# Patient Record
Sex: FEMALE | Race: WHITE | NOT HISPANIC OR LATINO | Employment: FULL TIME
[De-identification: names, ages, dates, MRNs, and addresses within clinical notes are randomized per-mention and may not be internally consistent; named-entity substitution may affect disease eponyms.]

---

## 2017-11-11 ENCOUNTER — HEALTH MAINTENANCE LETTER (OUTPATIENT)
Age: 42
End: 2017-11-11

## 2018-01-23 NOTE — PROGRESS NOTES
Susannah Goddard is here for a general check up. She is not fasting. She is due for eye exams and due for dental visits. Wears seat belt-yes. Bike helmet- na.   Concerns today:    RAZA Davalos is a 43 yo woman who is here for a check up. She drives in from [place] to see me. She is due for routine pap smear. She would like routine STD screening. She is up to date on vaccines. She has a previous history of alcohol and drug abuse but has been sober since 2011.     Raynaud's disease   She has Raynauds and is asking for refill of her amlodipine.     Depression and anxiety  Susannah is under the care of a psychiatrist in [place]. She reports she is doing well. Is currently being weaned off Cymbalta and Lexapro. She was started on Fluoxetine 20mg daily. She remains on Seroquel. She is being prescribed medical marijuana for treatment of PTSD. She feels that is helping. She has been sober since 2011. 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 She completed her masters. She is excited to me moving forward in her career.     PHQ9 score = 2  FRANK 7 score = 18      Health Maintenance   Topic Date Due     PHQ-9 Q1 MONTH  12/01/2013     INFLUENZA VACCINE (SYSTEM ASSIGNED)  09/01/2017     PAP SCREENING Q3 YR (SYSTEM ASSIGNED)  09/06/2017     TETANUS IMMUNIZATION (SYSTEM ASSIGNED)  01/28/2020         Patient Active Problem List   Diagnosis     Depression with anxiety     Herpes, genital     Alcohol dependence in remission (H)       Past Surgical History:   Procedure Laterality Date     CHOLECYSTECTOMY  2009     DILATION AND CURETTAGE  2002     IUD PARAGARD  2009       Family History   Problem Relation Age of Onset     Breast Cancer Paternal Grandmother 84     HEART DISEASE Paternal Grandfather 60     Heart disease     HEART DISEASE Maternal Grandfather 60     Heart disease     Depression Sister      Depression Father      Hypertension Father      Alcohol/Drug Father        Social  , no children , current  partner  Finished classes for masters, Licensed Alcohol and Drug counselor  She is starting an [employer] at [employer], paid position    HABITS:  Tob: none  ETOH: no, sober since 2011  THC for a year, medical Dr. Isael penaloza Rx for PTSD  Calcium: soy milk, 2 per day  Caffeine: 2-3 per day  Exercise: 3x per week    OB/GYN HISTORY:  LMP: Patient's last menstrual period was 01/04/2018 (approximate).  Hx abnormal pap?  none  STD hx?  HSV 2, no current outbreak  cycle length:  Monthly  Flow x 4-5 days  dysmenorrhea/PMS:  minimal  Vasomotor sx:  none  Contraception: condoms  G 1   P 1   A 0  Self Breast exam:  none    Current Outpatient Prescriptions   Medication Sig Dispense Refill     FLUOXETINE HCL PO Take 20 mg by mouth daily       amLODIPine (NORVASC) 2.5 MG tablet Take 1 tablet (2.5 mg) by mouth daily Needs clinic visit for further refills  FINAL FILL 30 tablet 0     acyclovir (ZOVIRAX) 400 MG tablet Take 1 tablet (400 mg) by mouth daily Needs clinic visit and labs for further refills  FINAL FILL 30 tablet 0     QUEtiapine (SEROQUEL) 25 MG tablet Take 50 mg by mouth 2 times daily        ibuprofen (ADVIL,MOTRIN) 400 MG tablet Take 400 mg by mouth daily 800 mg       Melatonin-Pyridoxine (MELATONIN-VITAMIN B-6 PO) Take 1 tablet by mouth nightly as needed       escitalopram (LEXAPRO) 20 MG tablet Take 1 tablet (20 mg) by mouth daily 90 tablet 3     naproxen sodium (ALEVE) 220 MG tablet Take  by mouth as needed.       atomoxetine (STRATTERA) 10 MG capsule Take 3 capsules by mouth 3 times daily.       DULoxetine (CYMBALTA) 30 MG capsule Take 90 mg by mouth daily. 30 mg capsule qam  60 mg capsule ghs       Multiple Vitamin (MULTIVITAMIN  S) CAPS Take one daily       Allergies   Allergen Reactions     Dilaudid [Hydromorphone]          ROS  CONSTITUTIONAL:NEGATIVE for fever, chills, change in weight, she is overweight  INTEGUMENTARY/SKIN: NEGATIVE for worrisome rashes, moles or lesions  EYES: NEGATIVE for  "vision changes or irritation  ENT/MOUTH: NEGATIVE for ear, mouth and throat problems  RESP:NEGATIVE for significant cough or SOB  BREAST: NEGATIVE for masses, tenderness or discharge  CV: NEGATIVE for chest pain, palpitations, PEREZ, orthopnea, PND  or peripheral edema  GI: NEGATIVE for nausea, abdominal pain, heartburn, or change in bowel habits  :NEGATIVE for frequency, dysuria, or hematuria, hx of genital herpes  MUSCULOSKELETAL:NEGATIVE for significant arthralgias or myalgia  NEURO: NEGATIVE for weakness, dizziness or paresthesias  ENDOCRINE: NEGATIVE for polyuria/dipsia,  temperature intolerance, skin/hair changes  HEME/ALLERGY/IMMUNE: NEGATIVE for bleeding problems  PSYCHIATRIC: hx of anxiety, PTSD, depression, currently under the care of a psychiatrist    EXAM  BP (!) 143/91  Pulse 98  Temp 98.3  F (36.8  C) (Oral)  Ht 5' 5.16\" (165.5 cm)  Wt 191 lb 4 oz (86.8 kg)  LMP 01/04/2018 (Approximate)  SpO2 100%  BMI 31.67 kg/m2      GENERAL APPEARANCE: Alert, pleasant, NAD, overweight  EYES: PERRL, EOMI, conjunctiva clear  HENT: TM normal bilaterally. Nose and mouth without lesions  NECK: no adenopathy, thyroid normal to palpation   RESP: lungs clear to auscultation bilaterally  BREAST: normal without masses, no tenderness or nipple discharge and no palpable  axillary masses or adenopathy   CV: regular rate and rhythm, normal S1 S2, no murmur, no carotid bruits  ABDOMEN: soft, nontender, without HSM or masses. Bowel sounds normal  : External genitalia without lesions, cervix normal, pap easily obtained, no abnormal discharge, bimanual exam without adnexal masses or tenderness, uterus non enlarged  RECTAL EXAM: not done   MS: extremities normal- no gross deformities noted, no tender, hot or swollen joints.    SKIN: no suspicious lesions or rashes  NEURO: Normal strength and tone, sensory exam grossly normal, DTR normoreflexive in upper and lower extremities  PSYCH: mentation appears normal. and affect " normal/bright.  EXT: no peripheral edema, pedal pulses palpable    Assessment:  (Z00.00) Routine general medical examination at a health care facility  (primary encounter diagnosis)  Comment: 41 yo woman in stable health  Plan: Lipid Panel ([place]), CBC with platelets,         Comprehensive Metabolic Panel ([place]), HPV         High Risk Types DNA Cervical        Anticipatory guidance given today regarding diet, exercise, calcium intake.  Pap is done today. Recommend sensible approach to weight loss    (Z12.4) Screening for malignant neoplasm of cervix  Comment: due for routine screening  Plan: Pap imaged thin layer screen with HPV -         recommended age 30 - 65 years (select HPV order        below)            (Z11.3) Screen for STD (sexually transmitted disease)  Comment: no current symptoms  Plan: Neisseria gonorrhoeae PCR, Chlamydia         trachomatis PCR, Anti Treponema, HIV Antigen         Antibody Combo        Discussed safe sex, consistent use of condoms with partners    (I73.00) Raynaud's disease without gangrene  Comment: she has worsening of symptoms in winter months  Plan: amLODIPine (NORVASC) 5 MG tablet        Refilled michela Nunez MD  Internal Medicine/Pediatrics

## 2018-01-24 ENCOUNTER — OFFICE VISIT (OUTPATIENT)
Dept: FAMILY MEDICINE | Facility: CLINIC | Age: 43
End: 2018-01-24
Payer: COMMERCIAL

## 2018-01-24 VITALS
HEART RATE: 98 BPM | WEIGHT: 191.25 LBS | OXYGEN SATURATION: 100 % | DIASTOLIC BLOOD PRESSURE: 83 MMHG | SYSTOLIC BLOOD PRESSURE: 146 MMHG | HEIGHT: 65 IN | BODY MASS INDEX: 31.86 KG/M2 | TEMPERATURE: 98.3 F

## 2018-01-24 DIAGNOSIS — Z12.4 SCREENING FOR MALIGNANT NEOPLASM OF CERVIX: ICD-10-CM

## 2018-01-24 DIAGNOSIS — I73.00 RAYNAUD'S DISEASE WITHOUT GANGRENE: ICD-10-CM

## 2018-01-24 DIAGNOSIS — Z11.3 SCREEN FOR STD (SEXUALLY TRANSMITTED DISEASE): ICD-10-CM

## 2018-01-24 DIAGNOSIS — Z00.00 ROUTINE GENERAL MEDICAL EXAMINATION AT A HEALTH CARE FACILITY: Primary | ICD-10-CM

## 2018-01-24 LAB
ALBUMIN SERPL-MCNC: 3.8 G/DL (ref 3.3–4.6)
ALP SERPL-CCNC: 55 U/L (ref 40–150)
ALT SERPL-CCNC: 24 U/L (ref 0–50)
AST SERPL-CCNC: 25 U/L (ref 0–45)
BILIRUB SERPL-MCNC: 0.6 MG/DL (ref 0.2–1.3)
BUN SERPL-MCNC: 14 MG/DL (ref 5–24)
CALCIUM SERPL-MCNC: 9.4 MG/DL (ref 8.5–10.4)
CHLORIDE SERPLBLD-SCNC: 104 MMOL/L (ref 94–109)
CHOLEST SERPL-MCNC: 153 MG/DL (ref 0–200)
CHOLEST/HDLC SERPL: 3 {RATIO} (ref 0–5)
CO2 SERPL-SCNC: 29 MMOL/L (ref 20–32)
CREAT SERPL-MCNC: 0.8 MG/DL (ref 0.6–1.3)
EGFR CALCULATED (BLACK REFERENCE): 100.2
EGFR CALCULATED (NON BLACK REFERENCE): 82.8
ERYTHROCYTE [DISTWIDTH] IN BLOOD BY AUTOMATED COUNT: 13.4 % (ref 10–15)
FASTING SPECIMEN: NO
GLUCOSE SERPL-MCNC: 93 MG/DL (ref 60–109)
HCT VFR BLD AUTO: 42.8 % (ref 35–47)
HDLC SERPL-MCNC: 52 MG/DL
HGB BLD-MCNC: 14.2 G/DL (ref 11.7–15.7)
LDLC SERPL CALC-MCNC: 83 MG/DL (ref 0–129)
MCH RBC QN AUTO: 30.9 PG (ref 26.5–33)
MCHC RBC AUTO-ENTMCNC: 33.2 G/DL (ref 31.5–36.5)
MCV RBC AUTO: 93 FL (ref 78–100)
PLATELET # BLD AUTO: 325 10E9/L (ref 150–450)
POTASSIUM SERPL-SCNC: 4.1 MMOL/L (ref 3.4–5.3)
PROT SERPL-MCNC: 7.1 G/DL (ref 6.8–8.8)
RBC # BLD AUTO: 4.6 10E12/L (ref 3.8–5.2)
SODIUM SERPL-SCNC: 141 MMOL/L (ref 133–144)
TRIGL SERPL-MCNC: 90 MG/DL (ref 0–150)
VLDL-CHOLESTEROL: 18 (ref 7–32)
WBC # BLD AUTO: 9.3 10E9/L (ref 4–11)

## 2018-01-24 RX ORDER — AMLODIPINE BESYLATE 5 MG/1
5 TABLET ORAL DAILY
Qty: 90 TABLET | Refills: 3 | Status: SHIPPED | OUTPATIENT
Start: 2018-01-24 | End: 2020-01-08

## 2018-01-24 RX ORDER — DULOXETIN HYDROCHLORIDE 30 MG/1
60 CAPSULE, DELAYED RELEASE ORAL DAILY
Qty: 60 CAPSULE | Refills: 0 | COMMUNITY
Start: 2018-01-24 | End: 2021-11-10

## 2018-01-24 NOTE — NURSING NOTE
"42 year old  Chief Complaint   Patient presents with     Physical     Annual physical and discuss labs.     Patient Inquiry     pt wants to discuss medical cannabis also wants to discuss hypertension       Blood pressure (!) 143/91, pulse 98, temperature 98.3  F (36.8  C), temperature source Oral, height 5' 5.16\" (165.5 cm), weight 191 lb 4 oz (86.8 kg), last menstrual period 01/04/2018, SpO2 100 %. Body mass index is 31.67 kg/(m^2).  Patient Active Problem List   Diagnosis     Depression with anxiety     Herpes, genital     Alcohol dependence in remission (H)       Wt Readings from Last 2 Encounters:   01/24/18 191 lb 4 oz (86.8 kg)   04/08/15 189 lb (85.7 kg)     BP Readings from Last 3 Encounters:   01/24/18 (!) 143/91   04/08/15 131/87   09/06/14 131/85         Current Outpatient Prescriptions   Medication     FLUOXETINE HCL PO     amLODIPine (NORVASC) 2.5 MG tablet     acyclovir (ZOVIRAX) 400 MG tablet     QUEtiapine (SEROQUEL) 25 MG tablet     ibuprofen (ADVIL,MOTRIN) 400 MG tablet     Melatonin-Pyridoxine (MELATONIN-VITAMIN B-6 PO)     escitalopram (LEXAPRO) 20 MG tablet     naproxen sodium (ALEVE) 220 MG tablet     atomoxetine (STRATTERA) 10 MG capsule     DULoxetine (CYMBALTA) 30 MG capsule     Multiple Vitamin (MULTIVITAMIN  S) CAPS     No current facility-administered medications for this visit.        Social History   Substance Use Topics     Smoking status: Former Smoker     Types: Cigarettes     Smokeless tobacco: Never Used      Comment: Previous social smoker     Alcohol use No      Comment: sober since 2/9/11       Health Maintenance Due   Topic Date Due     PHQ-9 Q1 MONTH  12/01/2013     INFLUENZA VACCINE (SYSTEM ASSIGNED)  09/01/2017     PAP SCREENING Q3 YR (SYSTEM ASSIGNED)  09/06/2017       Lab Results   Component Value Date    PAP NIL 09/06/2014 January 24, 2018 1:56 PM  "

## 2018-01-24 NOTE — MR AVS SNAPSHOT
After Visit Summary   1/24/2018    Susannah Goddard    MRN: [id number]           Patient Information     Date Of Birth          1975        Visit Information        Provider Department      1/24/2018 1:40 PM Madyson Nunez MD [facility]        Today's Diagnoses     Routine general medical examination at a health care facility    -  1    Screening for malignant neoplasm of cervix        Screen for STD (sexually transmitted disease)          Care Instructions      Preventive Health Recommendations  Female Ages 40 to 49    Yearly exam:     See your health care provider every year in order to  1. Review health changes.   2. Discuss preventive care.    3. Review your medicines if your doctor prescribed any.      Get a Pap test every three years (unless you have an abnormal result and your provider advises testing more often).      If you get Pap tests with HPV test, you only need to test every 5 years, unless you have an abnormal result. You do not need a Pap test if your uterus was removed (hysterectomy) and you have not had cancer.      You should be tested each year for STDs (sexually transmitted diseases), if you're at risk.       Ask your doctor if you should have a mammogram.      Have a colonoscopy (test for colon cancer) if someone in your family has had colon cancer or polyps before age 50.       Have a cholesterol test every 5 years.       Have a diabetes test (fasting glucose) after age 45. If you are at risk for diabetes, you should have this test every 3 years.    Shots: Get a flu shot each year. Get a tetanus shot every 10 years.     Nutrition:     Eat at least 5 servings of fruits and vegetables each day.    Eat whole-grain bread, whole-wheat pasta and brown rice instead of white grains and rice.    Talk to your provider about Calcium and Vitamin D.     Lifestyle    Exercise at least 150 minutes a week (an average of 30 minutes a day, 5 days a week). This will help you control  "your weight and prevent disease.    Limit alcohol to one drink per day.    No smoking.     Wear sunscreen to prevent skin cancer.    See your dentist every six months for an exam and cleaning.          Follow-ups after your visit        Who to contact     Please call your clinic at [phone] to:    Ask questions about your health    Make or cancel appointments    Discuss your medicines    Learn about your test results    Speak to your doctor   If you have compliments or concerns about an experience at your clinic, or if you wish to file a complaint, please contact [facility] Patient Relations at [phone] or email us at [email or web address]         Additional Information About Your Visit        [employer] Information     [employer] gives you secure access to your electronic health record. If you see a primary care provider, you can also send messages to your care team and make appointments. If you have questions, please call your primary care clinic.  If you do not have a primary care provider, please call [phone] and they will assist you.      [employer] is an electronic gateway that provides easy, online access to your medical records. With [employer], you can request a clinic appointment, read your test results, renew a prescription or communicate with your care team.     To access your existing account, please contact your [facility] or call [phone] for assistance.        Care EveryWhere ID     This is your Care EveryWhere ID. This could be used by other organizations to access your [place] medical records  [id number]        Your Vitals Were     Pulse Temperature Height Last Period Pulse Oximetry BMI (Body Mass Index)    98 98.3  F (36.8  C) (Oral) 5' 5.16\" (165.5 cm) 01/04/2018 (Approximate) 100% 31.67 kg/m2       Blood Pressure from Last 3 Encounters:   01/24/18 (!) 143/91   04/08/15 131/87   09/06/14 131/85    Weight from Last 3 " Encounters:   01/24/18 191 lb 4 oz (86.8 kg)   04/08/15 189 lb (85.7 kg)   09/06/14 181 lb (82.1 kg)              We Performed the Following     Anti Treponema     Chlamydia trachomatis PCR     HIV Antigen Antibody Combo     Neisseria gonorrhoeae PCR     Pap imaged thin layer screen with HPV - recommended age 30 - 65 years (select HPV order below)          Today's Medication Changes          These changes are accurate as of 1/24/18  2:25 PM.  If you have any questions, ask your nurse or doctor.               These medicines have changed or have updated prescriptions.        Dose/Directions    CYMBALTA 30 MG EC capsule   This may have changed:    - how much to take  - how to take this  - when to take this  - additional instructions   Generic drug:  DULoxetine   Changed by:  Madyson Nunez MD        Take one daily, tapering off   Quantity:  60 capsule   Refills:  0                Primary Care Provider Office Phone # Fax #    Madyson Nunez [phone]       [address] [place] [address]        Equal Access to Services     [facility]: Hadii otis beauchamp hadasho Soomaali, waaxda luqadaha, qaybta kaalmada adeegyada, chema bennett . So [facility] [phone].    ATENCIÓN: Si habla español, tiene a bowen disposición servicios gratuitos de asistencia lingüística. [facility] [phone].    We comply with applicable federal civil rights laws and [place] laws. We do not discriminate on the basis of race, color, national origin, age, disability, sex, sexual orientation, or gender identity.            Thank you!     Thank you for choosing [facility]  for your care. Our goal is always to provide you with excellent care. Hearing back from our patients is one way we can continue to improve our services. Please take a few minutes to complete the written survey that you may receive in the mail after your visit with us. Thank you!             Your Updated Medication  List - Protect others around you: Learn how to safely use, store and throw away your medicines at www.disposemymeds.org.          This list is accurate as of 1/24/18  2:25 PM.  Always use your most recent med list.                   Brand Name Dispense Instructions for use Diagnosis    acyclovir 400 MG tablet    ZOVIRAX    30 tablet    Take 1 tablet (400 mg) by mouth daily Needs clinic visit and labs for further refills  FINAL FILL    Genital herpes simplex, unspecified site       ALEVE 220 MG tablet   Generic drug:  naproxen sodium      Take  by mouth as needed.        CYMBALTA 30 MG EC capsule   Generic drug:  DULoxetine     60 capsule    Take one daily, tapering off        escitalopram 20 MG tablet    LEXAPRO    90 tablet    Take 1 tablet (20 mg) by mouth daily    Anxiety       FLUOXETINE HCL PO      Take 20 mg by mouth daily        ibuprofen 400 MG tablet    ADVIL/MOTRIN     Take 400 mg by mouth daily 800 mg        MELATONIN-VITAMIN B-6 PO      Take 1 tablet by mouth nightly as needed        MULTIvitamin  S Caps      Take one daily        SEROQUEL 25 MG tablet   Generic drug:  QUEtiapine      Take 50 mg by mouth 2 times daily        STRATTERA 10 MG capsule   Generic drug:  atomoxetine      Take 3 capsules by mouth 3 times daily.

## 2018-01-25 LAB
C TRACH DNA SPEC QL NAA+PROBE: NEGATIVE
HIV 1+2 AB+HIV1 P24 AG SERPL QL IA: NONREACTIVE
N GONORRHOEA DNA SPEC QL NAA+PROBE: NEGATIVE
SPECIMEN SOURCE: NORMAL
SPECIMEN SOURCE: NORMAL
T PALLIDUM IGG+IGM SER QL: NEGATIVE

## 2018-01-26 LAB
COPATH REPORT: NORMAL
PAP: NORMAL

## 2018-01-30 LAB
FINAL DIAGNOSIS: NORMAL
HPV HR 12 DNA CVX QL NAA+PROBE: NEGATIVE
HPV16 DNA SPEC QL NAA+PROBE: NEGATIVE
HPV18 DNA SPEC QL NAA+PROBE: NEGATIVE
SPECIMEN DESCRIPTION: NORMAL
SPECIMEN SOURCE CVX/VAG CYTO: NORMAL

## 2018-02-02 DIAGNOSIS — B37.31 YEAST INFECTION OF THE VAGINA: Primary | ICD-10-CM

## 2018-02-02 RX ORDER — FLUCONAZOLE 150 MG/1
150 TABLET ORAL ONCE
Qty: 2 TABLET | Refills: 0 | Status: SHIPPED | OUTPATIENT
Start: 2018-02-02 | End: 2018-02-02

## 2018-02-02 NOTE — TELEPHONE ENCOUNTER
See Mychart stream.  Using for yeast infection found on recent visit.     Isabelle Sibley RN  February 2, 2018 1:23 PM

## 2018-02-06 ASSESSMENT — PATIENT HEALTH QUESTIONNAIRE - PHQ9: 5. POOR APPETITE OR OVEREATING: NEARLY EVERY DAY

## 2018-02-06 ASSESSMENT — ANXIETY QUESTIONNAIRES
5. BEING SO RESTLESS THAT IT IS HARD TO SIT STILL: NEARLY EVERY DAY
GAD7 TOTAL SCORE: 18
6. BECOMING EASILY ANNOYED OR IRRITABLE: NEARLY EVERY DAY
3. WORRYING TOO MUCH ABOUT DIFFERENT THINGS: MORE THAN HALF THE DAYS
2. NOT BEING ABLE TO STOP OR CONTROL WORRYING: MORE THAN HALF THE DAYS
1. FEELING NERVOUS, ANXIOUS, OR ON EDGE: MORE THAN HALF THE DAYS
IF YOU CHECKED OFF ANY PROBLEMS ON THIS QUESTIONNAIRE, HOW DIFFICULT HAVE THESE PROBLEMS MADE IT FOR YOU TO DO YOUR WORK, TAKE CARE OF THINGS AT HOME, OR GET ALONG WITH OTHER PEOPLE: VERY DIFFICULT
7. FEELING AFRAID AS IF SOMETHING AWFUL MIGHT HAPPEN: NEARLY EVERY DAY

## 2018-02-07 ASSESSMENT — ANXIETY QUESTIONNAIRES: GAD7 TOTAL SCORE: 18

## 2018-02-07 ASSESSMENT — PATIENT HEALTH QUESTIONNAIRE - PHQ9: SUM OF ALL RESPONSES TO PHQ QUESTIONS 1-9: 2

## 2019-11-04 ENCOUNTER — HEALTH MAINTENANCE LETTER (OUTPATIENT)
Age: 44
End: 2019-11-04

## 2019-12-26 ENCOUNTER — TELEPHONE (OUTPATIENT)
Dept: FAMILY MEDICINE | Facility: CLINIC | Age: 44
End: 2019-12-26

## 2019-12-26 NOTE — TELEPHONE ENCOUNTER
Dr. Nunez - see below: OK to have the med refill appointment on 1/8/19 for amlodipine?  _________________________________  I called patient, the last time Dr. Nunez saw patient and prescribed amlodipine 5 mg was January, 2018. Since that time she was at [facility] and had it prescribed through a provider associated with that facility. She takes this medication for Raynaud's, takes it daily, and ran out 2 days ago. The provider is no longer at that clinic. She plans to go to Urgent Care today for a refill - she lives in [place], [place] about 2 hours away from [facility]. She drives to [facility] monthly to see her psych care team. She has Annual Exam with Dr. Nunez on 3/18/19. She has med refill appointment on 1/8/19 with Dr. Nunez to get amlodipine refilled.     Leana Garcia, RN  12/26/19  12:30 PM

## 2019-12-26 NOTE — TELEPHONE ENCOUNTER
[facility] Call Center    Phone Message    May a detailed message be left on voicemail: yes    Reason for Call: Medication Question or concern regarding medication   Prescription Clarification  Name of Medication: amlodipine   Prescribing Provider: was Dr Nunez in 2016   Pharmacy: NA   What on the order needs clarification? The pharmacy is going to send a request to Dr Mayra GOODSON          Action Taken: Message routed to:  [facility]

## 2019-12-26 NOTE — TELEPHONE ENCOUNTER
No refill until she is seen in clinic.   Sounds like she has a plan to go to  for med refill in interim.     Thanks   Madyson Nunez MD   Internal Medicine/Pediatrics

## 2020-01-07 NOTE — PROGRESS NOTES
"Susannah Goddard is a 44 year old female here for the following issues:    Raynaud's disease without gangrene  Susannah was started on a low dose amlodipine for treatment of her Raynaud's disease at an office visit with me in 04/08/2015.  She takes this amlodipine year round, and it has helped treat her Raynauds.  This has worked well, and she is requesting refills.    Alcohol Dependence in remission  Susannah checked into [place] [employer] on 04/2018.  She graduated from this in the end of May 2019.  She is sober, and her two year valentin will be in May 2020.  She is now living in [place].  She has been attending AA meetings, working 25 hours a week to continue attending meetings. She had some medication changes today. she is followed by a psychiatrist for treatment of anxiety and depression.        Patient Active Problem List   Diagnosis     Depression with anxiety     Herpes, genital     Alcohol dependence in remission (H)       Current Outpatient Medications   Medication Sig Dispense Refill     amLODIPine (NORVASC) 5 MG tablet Take 1 tablet (5 mg) by mouth daily 90 tablet 3     DULoxetine (CYMBALTA) 30 MG EC capsule Take 40 mg by mouth 2 times daily Take one daily, tapering off 60 capsule 0     FLUOXETINE HCL PO Take 10 mg by mouth daily        ibuprofen (ADVIL,MOTRIN) 400 MG tablet Take 400 mg by mouth daily 800 mg       Melatonin-Pyridoxine (MELATONIN-VITAMIN B-6 PO) Take 1 tablet by mouth nightly as needed       QUEtiapine (SEROQUEL) 25 MG tablet Take 25 mg by mouth 3 times daily        QUEtiapine Fumarate (SEROQUEL PO) Take 300 mg by mouth At Bedtime       Multiple Vitamin (MULTIVITAMIN  S) CAPS Take one daily       naproxen sodium (ALEVE) 220 MG tablet Take  by mouth as needed.         Allergies   Allergen Reactions     Dilaudid [Hydromorphone]         EXAM  BP (!) 137/95 (BP Location: Left arm, Patient Position: Sitting, Cuff Size: Adult Large)   Pulse 87   Temp 98.5  F (36.9  C) (Oral)   Ht 1.64 m (5' 4.57\")   Wt " 84.9 kg (187 lb 4 oz)   SpO2 97%   BMI 31.58 kg/m    Gen: Alert, pleasant, NAD  COR: S1,S2, no murmur  Lungs: CTA bilaterally, no rhonchi, wheezes or rales  Ext: warm, no edema      Assessment:  (I73.00) Raynaud's disease without gangrene  Comment: doing well, is asking for medication refill  Plan: amLODIPine (NORVASC) 5 MG tablet        Refilled x 1 yr.       Madyson Nunez MD  Internal Medicine/Pediatrics      I, Spencer Meraz, am serving as a scribe to document services personally performed by Dr. Madyson Nunez, based on data collection and the provider's statements to me. Dr. Nunez has reviewed, edited, and approved the above note.

## 2020-01-08 ENCOUNTER — OFFICE VISIT (OUTPATIENT)
Dept: FAMILY MEDICINE | Facility: CLINIC | Age: 45
End: 2020-01-08

## 2020-01-08 VITALS
HEIGHT: 65 IN | HEART RATE: 87 BPM | WEIGHT: 187.25 LBS | TEMPERATURE: 98.5 F | BODY MASS INDEX: 31.2 KG/M2 | DIASTOLIC BLOOD PRESSURE: 88 MMHG | SYSTOLIC BLOOD PRESSURE: 128 MMHG | OXYGEN SATURATION: 97 %

## 2020-01-08 DIAGNOSIS — I73.00 RAYNAUD'S DISEASE WITHOUT GANGRENE: ICD-10-CM

## 2020-01-08 RX ORDER — AMLODIPINE BESYLATE 5 MG/1
5 TABLET ORAL DAILY
Qty: 90 TABLET | Refills: 3 | Status: SHIPPED | OUTPATIENT
Start: 2020-01-08 | End: 2020-12-23

## 2020-01-08 ASSESSMENT — MIFFLIN-ST. JEOR: SCORE: 1483.36

## 2020-01-08 ASSESSMENT — PATIENT HEALTH QUESTIONNAIRE - PHQ9: SUM OF ALL RESPONSES TO PHQ QUESTIONS 1-9: 10

## 2020-01-08 NOTE — NURSING NOTE
"44 year old  Chief Complaint   Patient presents with     Recheck Medication     refill amlodipine         Blood pressure (!) 137/95, pulse 87, temperature 98.5  F (36.9  C), temperature source Oral, height 1.64 m (5' 4.57\"), weight 84.9 kg (187 lb 4 oz), SpO2 97 %. Body mass index is 31.58 kg/m .  Patient Active Problem List   Diagnosis     Depression with anxiety     Herpes, genital     Alcohol dependence in remission (H)       Wt Readings from Last 2 Encounters:   01/08/20 84.9 kg (187 lb 4 oz)   01/24/18 86.8 kg (191 lb 4 oz)     BP Readings from Last 3 Encounters:   01/08/20 (!) 137/95   01/24/18 146/83   04/08/15 131/87         Current Outpatient Medications   Medication     amLODIPine (NORVASC) 5 MG tablet     DULoxetine (CYMBALTA) 30 MG EC capsule     FLUOXETINE HCL PO     Melatonin-Pyridoxine (MELATONIN-VITAMIN B-6 PO)     QUEtiapine (SEROQUEL) 25 MG tablet     QUEtiapine Fumarate (SEROQUEL PO)     acyclovir (ZOVIRAX) 400 MG tablet     ibuprofen (ADVIL,MOTRIN) 400 MG tablet     Multiple Vitamin (MULTIVITAMIN  S) CAPS     naproxen sodium (ALEVE) 220 MG tablet     No current facility-administered medications for this visit.        Social History     Tobacco Use     Smoking status: Former Smoker     Types: Cigarettes     Smokeless tobacco: Never Used     Tobacco comment: Previous social smoker   Substance Use Topics     Alcohol use: No     Comment: sober since 2/9/11     Drug use: No       Health Maintenance Due   Topic Date Due     PHQ-9  02/24/2018     PREVENTIVE CARE VISIT  01/24/2019     INFLUENZA VACCINE (1) 09/01/2019     PHQ-2  01/01/2020       Lab Results   Component Value Date    PAP NIL 01/24/2018 January 8, 2020 3:52 PM    "

## 2020-11-22 ENCOUNTER — HEALTH MAINTENANCE LETTER (OUTPATIENT)
Age: 45
End: 2020-11-22

## 2020-12-22 NOTE — PROGRESS NOTES
"  Family Medicine Video Visit Note  Susannah Goddard is a 45 year old female who is being evaluated via a billable video visit.  Consent documented below.  Chief Complaint   Patient presents with     Recheck Medication     medication refill and labs      The patient has been notified of following:     Can you please confirm what state you are currently located in? [place]   \"If you are located in a state other than [place] we cannot proceed with your visit.  This is due to state licensing laws that do not allow your provider to practice in another state.  This is not a billing or insurance issue. Please let me know if you need prescriptions filled or have other immediate concerns and I will get that message to your care team. I can also have you speak to our  to make an appointment when you are back in the [place].\"       Video Visit Consent     \"This video visit will be conducted via a call between you and your physician/provider. We have found that certain health care needs can be provided without the need for an in-person physical exam.  This service lets us provide the care you need with a video conversation.  If a prescription is necessary we can send it directly to your pharmacy.  If lab work is needed we can place an order for that and you can then stop by our lab to have the test done at a later time.    Video visits are billed at different rates depending on your insurance coverage.  Please reach out to your insurance provider with any questions.    If during the course of the call the physician/provider feels a video visit is not appropriate, you will not be charged for this service.\"    Patient has given verbal consent for Video visit? Yes  How would you like to obtain your AVS? MyChart  If you are dropped from the video visit, the video invite should be resent to:   Will anyone else be joining your video visit? No  :[id number]}  Susannah Goddard is a 45 year old female who presents to clinic today for the " following health issues:    Raynauds Lisa is a 44 yo woman with hx of Raynauds. She takes amlodipine 5mg daily all year round for relief of symptoms. She is in need of medication refill.     Genital herpes  Susannah takes Valacyclovir 500mg daily to help prevent outbreaks of genital herpes. Last outbreak was roughly 5 month ago. Prior to taking the medication daily, she had an outbreak every 2-3 months. She also takes Lysine daily.   Take 500mg , and then if you feel an outbreatk 2x per day x 3 days.  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 Nonresidental intensive out patient, adolescents with substance use disorder.    Depression with anxiety  Susannah sees a mental health provider that prescribes Duloxetine 60mg once daily. She is weaning down on seroquel dose and is doing well. She has been sober 3 yr. attends AA meetings. Self cares include weekly massage, getting to the gym.     Obesity  Body mass index is 32.12 kg/m .  Susannah is working on weight loss, with [employer].   Wt Readings from Last 2 Encounters:   12/23/20 87.5 kg (193 lb)   01/08/20 84.9 kg (187 lb 4 oz)       Patient Active Problem List   Diagnosis     Depression with anxiety     Herpes, genital     Alcohol dependence in remission (H)     Past Surgical History:   Procedure Laterality Date     CHOLECYSTECTOMY  2009     DILATION AND CURETTAGE  2002     IUD PARAGARD  2009       Social History     Tobacco Use     Smoking status: Former Smoker     Types: Cigarettes     Smokeless tobacco: Never Used     Tobacco comment: Previous social smoker   Substance Use Topics     Alcohol use: No     Comment: sober since 2/9/11     Family History   Problem Relation Age of Onset     Breast Cancer Paternal Grandmother 84     Heart Disease Paternal Grandfather 60         "Heart disease     Heart Disease Maternal Grandfather 60        Heart disease     Depression Sister      Depression Father      Hypertension Father      Alcohol/Drug Father            Reviewed and updated as needed this visit by Provider       Review of Systems   Constitutional, HEENT, cardiovascular, pulmonary, gi and gu systems are negative, except as otherwise noted.      Objective     Ht 1.651 m (5' 5\")   Wt 87.5 kg (193 lb)   BMI 32.12 kg/m       Physical Exam     GENERAL: Healthy, alert and no distress  EYES: Eyes grossly normal to inspection.   RESP: No audible wheeze, cough, or increased work of breathing.    SKIN: Visible skin clear.  NEURO:   Mentation and speech appropriate for age.  PSYCH: Mentation appears normal, affect normal/bright, judgement and insight intact, normal speech      ASSESSMENT:  (I73.00) Raynaud's disease without gangrene  (primary encounter diagnosis)  Comment: doing well with daily dose to treat Raynauds  Plan: amLODIPine (NORVASC) 5 MG tablet        Refilled x 1 yr    (A60.00) Genital herpes simplex, unspecified site  Comment: currently doing well with preventive therapy  Plan: valACYclovir (VALTREX) 500 MG tablet  Continue daily dosing. Recommend that is she has an outbreak, dose valacyclovir, 500mg tablet twice daily x 3 days.       Video-Visit Details    Type of service:  Video Visit  Start time: 4:12pm  End time: 4:28pm  Total : 16 minutes    Originating Location (pt. Location): Home    Distant Location (provider location):  [facility]   Platform used for Video Visit: Lit    No follow-ups on file.       Madyson Nunez MD                      "

## 2020-12-23 ENCOUNTER — VIRTUAL VISIT (OUTPATIENT)
Dept: FAMILY MEDICINE | Facility: CLINIC | Age: 45
End: 2020-12-23
Payer: MEDICARE

## 2020-12-23 VITALS — BODY MASS INDEX: 32.15 KG/M2 | WEIGHT: 193 LBS | HEIGHT: 65 IN

## 2020-12-23 DIAGNOSIS — A60.00 GENITAL HERPES SIMPLEX, UNSPECIFIED SITE: ICD-10-CM

## 2020-12-23 DIAGNOSIS — I73.00 RAYNAUD'S DISEASE WITHOUT GANGRENE: Primary | ICD-10-CM

## 2020-12-23 RX ORDER — VALACYCLOVIR HYDROCHLORIDE 500 MG/1
TABLET, FILM COATED ORAL
Qty: 90 TABLET | Refills: 3 | Status: SHIPPED | OUTPATIENT
Start: 2020-12-23 | End: 2021-11-17

## 2020-12-23 RX ORDER — AMLODIPINE BESYLATE 5 MG/1
5 TABLET ORAL DAILY
Qty: 90 TABLET | Refills: 3 | Status: SHIPPED | OUTPATIENT
Start: 2020-12-23 | End: 2022-01-07

## 2020-12-23 RX ORDER — VALACYCLOVIR HYDROCHLORIDE 500 MG/1
500 TABLET, FILM COATED ORAL DAILY PRN
COMMUNITY
End: 2020-12-23 | Stop reason: DRUGHIGH

## 2020-12-23 ASSESSMENT — MIFFLIN-ST. JEOR: SCORE: 1511.32

## 2020-12-27 PROBLEM — I73.00 RAYNAUD'S DISEASE WITHOUT GANGRENE: Status: ACTIVE | Noted: 2020-12-27

## 2021-01-15 ENCOUNTER — HEALTH MAINTENANCE LETTER (OUTPATIENT)
Age: 46
End: 2021-01-15

## 2021-02-13 ENCOUNTER — HEALTH MAINTENANCE LETTER (OUTPATIENT)
Age: 46
End: 2021-02-13

## 2021-05-19 NOTE — PROGRESS NOTES
Susannah Goddard is here for a general check up. She is not fasting. She is due for eye exams and dental visits. Wears seat belt-yes.     HCM  Mammogram due now  COVID 19 vaccine -she is deciding. Had concerns so we discussed vaccine benefits and risks. She is considering after our discussion so vaccination of Td is held today  Td due holding for now, would obtain 2 wks after completing the COVID vaccine  Pap due 2023  Hepatitis C screening due    Diet: [employer], 2 shakes a day and healthy meal, eats meat  She has lost 11 pounds by our records  Wt Readings from Last 4 Encounters:   05/24/21 82.7 kg (182 lb 6.4 oz)   12/23/20 87.5 kg (193 lb)   01/08/20 84.9 kg (187 lb 4 oz)   01/24/18 86.8 kg (191 lb 4 oz)     Neck and upper back pain  Susannah reports persistent neck and upper back discomfort, constant aching. She has hx of stenosis at her cervical spine and sometimes feels pain radiating into her arms. However she is concerned that pain, in part, is due to heavy breasts, DD cup.   She would like referral to plastic surgeon to discuss breast reduction.      Health Maintenance   Topic Date Due     ADVANCE CARE PLANNING  Never done     MAMMO SCREENING  Never done     COVID-19 Vaccine (1) Never done     HEPATITIS C SCREENING  Never done     MEDICARE ANNUAL WELLNESS VISIT  01/24/2019     DTAP/TDAP/TD IMMUNIZATION (3 - Td) 01/28/2020     PHQ-9  02/08/2020     INFLUENZA VACCINE (Season Ended) 09/01/2021     HPV TEST  01/24/2023     LIPID  01/24/2023     PAP  01/24/2023     HIV SCREENING  Completed     Pneumococcal Vaccine: Pediatrics (0 to 5 Years) and At-Risk Patients (6 to 64 Years)  Aged Out     IPV IMMUNIZATION  Aged Out     MENINGITIS IMMUNIZATION  Aged Out     HEPATITIS B IMMUNIZATION  Aged Out         Patient Active Problem List   Diagnosis     Depression with anxiety     Herpes, genital     Alcohol dependence in remission (H)     Raynaud's disease without gangrene       Past Surgical History:   Procedure Laterality Date      CHOLECYSTECTOMY  2009     DILATION AND CURETTAGE  2002     IUD PARAGARD  2009       Family History   Problem Relation Age of Onset     Breast Cancer Paternal Grandmother 84     Heart Disease Paternal Grandfather 60        Heart disease     Heart Disease Maternal Grandfather 60        Heart disease     Depression Father      Hypertension Father      Alcohol/Drug Father      Depression Sister        Social  Single, no current partner  Just completed her Masters and internship  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 Nonresidental intensive out patient, adolescents with substance use disorder.    HABITS:  Tob: none  ETOH: sober since 2011  THC for a year, medical tremaine, Dr. Isael Aponte Rx for PTSD--no longer using it  Calcium: soy milk, 3 per day  Caffeine: 2-3 per day  Exercise: 3x per week     OB/GYN HISTORY:  LMP Patient's last menstrual period was 05/19/2021 (exact date).   No partner at this time  Hx abnormal pap?  none  STD hx?  HSV 2, no current outbreak  cycle length:  Monthly cycle  Flow x  4-5 days, heavy flow, changing every hour for first 2 days for the past cycles  Took off work due to symptoms  dysmenorrhea/PMS:  minimal  Vasomotor sx: starting during the day  Contraception: condoms  G 1   P 1   A 0  Self Breast exam:  none    Current Outpatient Medications   Medication Sig Dispense Refill     amLODIPine (NORVASC) 5 MG tablet Take 1 tablet (5 mg) by mouth daily 90 tablet 3     DULoxetine (CYMBALTA) 30 MG EC capsule Take 60 mg by mouth 2 times daily Take one daily, tapering off 60 capsule 0     ibuprofen (ADVIL,MOTRIN) 400 MG tablet Take 400 mg by mouth daily 800 mg       LYSINE HCL PO Take by mouth daily Taken for cold sores        QUEtiapine Fumarate (SEROQUEL PO) Take 300 mg by mouth At Bedtime       valACYclovir (VALTREX) 500 MG tablet Take 1 po q day to  "prevent HSV outbreak 90 tablet 3     Allergies   Allergen Reactions     Dilaudid [Hydromorphone]          ROS  CONSTITUTIONAL:NEGATIVE for fever, chills, Concerted weight loss  INTEGUMENTARY/SKIN: NEGATIVE for worrisome rashes, moles or lesions  EYES: NEGATIVE for vision changes or irritation  ENT/MOUTH: NEGATIVE for ear, mouth and throat problems  RESP:NEGATIVE for significant cough or SOB  BREAST: NEGATIVE for masses, tenderness or discharge  CV: NEGATIVE for chest pain, palpitations, PEREZ, orthopnea, PND  or peripheral edema  GI: NEGATIVE for nausea, abdominal pain, heartburn, or change in bowel habits  :NEGATIVE for frequency, dysuria, or hematuria  MUSCULOSKELETAL:NEGATIVE for significant arthralgias or myalgia, positive neck and upper back pain  NEURO: NEGATIVE for weakness, dizziness or paresthesias, Positive headaches  ENDOCRINE: NEGATIVE for polyuria/dipsia,  temperature intolerance, skin/hair changes  HEME/ALLERGY/IMMUNE: NEGATIVE for bleeding problems  PSYCHIATRIC: NEGATIVE for changes in mood or affect, doing well at this time  PHQ 2/6/2018 1/8/2020 5/24/2021   PHQ-9 Total Score 2 10 4   Q9: Thoughts of better off dead/self-harm past 2 weeks Not at all Not at all Not at all     FRANK-7 SCORE 6/12/2014 2/6/2018 5/24/2021   Total Score 21 - -   Total Score - 18 0       EXAM  /82   Pulse 81   Temp 97  F (36.1  C)   Resp 14   Ht 1.65 m (5' 4.96\")   Wt 82.7 kg (182 lb 6.4 oz)   LMP 05/19/2021 (Exact Date)   SpO2 96%   Breastfeeding No   BMI 30.39 kg/m    GENERAL APPEARANCE: Alert, pleasant, NAD  EYES: PERRL, EOMI, conjunctiva clear  HENT: TM normal bilaterally. Nose and mouth masked  NECK: no adenopathy, thyroid normal to palpation  RESP: lungs clear to auscultation bilaterally,   BREAST: normal without masses, no tenderness or nipple discharge and no palpable  axillary masses or adenopathy   CV: regular rate and rhythm, normal S1 S2, no murmur, no carotid bruits  ABDOMEN: soft, nontender, " without HSM or masses. Bowel sounds normal  MS: extremities normal- no gross deformities noted, no tender, hot or swollen joints.    SKIN: no suspicious lesions or rashes  NEURO: Normal strength and tone, sensory exam grossly normal, DTR normoreflexive in upper and lower extremities  PSYCH: mentation appears normal. and affect normal/bright.  EXT: no peripheral edema, pedal pulses palpable    Assessment:  (Z00.00) Routine general medical examination at a health care facility  (primary encounter diagnosis)  Comment: 47 yo woman in good health  Plan: Comprehensive Metabolic Panel ([facility]), CBC         with Plt ([facility])        Today we discussed ways to maintain a healthy lifestyle with sensible eating, regular exercise and self cares. We dicussed calcium and Vitamin D intake, vaccinations and preventive health screens.    Congratulated her on 10 yr of sobriety. Discussed getting COVID vaccine and she will consider this. Recommend Tdap vaccine but would make COVID vaccination priority given ongoing outbreak in community. Once complete then wait 2 wks prior to getting Tdap vaccine    (Z12.31) Visit for screening mammogram  Comment: normal clinical exam, due for routine screening  Plan: Mammogram - routine screening        Referral is given    (Z13.220) Screening for lipid disorders  Comment: not fasting today. TG likely elevated due to not fasting. LDL is low at 60, total cholesterol is low. HDL low, goal is >50  Recent Labs   Lab Test 05/24/21  1443 01/24/18  1445   CHOL 167.0 153.0   HDL 46.0* 52.0   LDL 60.0 83.0   TRIG 306.0* 90.0   CHOLHDLRATIO 3.7 3.0   Plan: Lipid Panel ([facility])        Continue efforts at weight loss and regular exercise program. She is doing very well with [employer].     (Z23) Need for Tdap vaccination  Comment: She is considering getting COVID vaccine  Plan: RECOMMEND HOLDING ON TDAP UNTIL AFTER COVID SERIES IS COMPLETE    (Z11.59) Need for hepatitis C screening test  Comment: Routine  screening  Plan: Hepatitis C Screen Reflex to HCV RNA Quant and         Genotype            (M54.2) Neck pain  Comment: chronic neck and upper back pain that she feels is caused by heavy breasts, DD cup  Plan: PLASTIC SURGERY REFERRAL        Refer to plastic surgery for discussion on breast reduction    (M54.9,  G89.29) Upper back pain, chronic  Comment: chronic neck and upper back pain that she feels is caused by heavy breasts, DD cup  Plan: PLASTIC SURGERY REFERRAL        Refer to plastic surgery for discussion on breast reduction    Madyson Nunez MD  Internal Medicine/Pediatrics

## 2021-05-24 ENCOUNTER — OFFICE VISIT (OUTPATIENT)
Dept: FAMILY MEDICINE | Facility: CLINIC | Age: 46
End: 2021-05-24
Payer: MEDICARE

## 2021-05-24 VITALS
HEIGHT: 65 IN | OXYGEN SATURATION: 96 % | SYSTOLIC BLOOD PRESSURE: 129 MMHG | BODY MASS INDEX: 30.39 KG/M2 | WEIGHT: 182.4 LBS | TEMPERATURE: 97 F | DIASTOLIC BLOOD PRESSURE: 82 MMHG | RESPIRATION RATE: 14 BRPM | HEART RATE: 81 BPM

## 2021-05-24 DIAGNOSIS — M54.2 NECK PAIN: ICD-10-CM

## 2021-05-24 DIAGNOSIS — Z12.31 VISIT FOR SCREENING MAMMOGRAM: ICD-10-CM

## 2021-05-24 DIAGNOSIS — G89.29 UPPER BACK PAIN, CHRONIC: ICD-10-CM

## 2021-05-24 DIAGNOSIS — Z23 NEED FOR TDAP VACCINATION: ICD-10-CM

## 2021-05-24 DIAGNOSIS — M54.9 UPPER BACK PAIN, CHRONIC: ICD-10-CM

## 2021-05-24 DIAGNOSIS — Z13.220 SCREENING FOR LIPID DISORDERS: ICD-10-CM

## 2021-05-24 DIAGNOSIS — Z00.00 ROUTINE GENERAL MEDICAL EXAMINATION AT A HEALTH CARE FACILITY: Primary | ICD-10-CM

## 2021-05-24 DIAGNOSIS — Z11.59 NEED FOR HEPATITIS C SCREENING TEST: ICD-10-CM

## 2021-05-24 LAB
ALBUMIN SERPL-MCNC: 3.3 G/DL (ref 3.3–4.6)
ALP SERPL-CCNC: 55 U/L (ref 40–150)
ALT SERPL-CCNC: 24 U/L (ref 0–50)
AST SERPL-CCNC: 23 U/L (ref 0–45)
BILIRUB SERPL-MCNC: 0.6 MG/DL (ref 0.2–1.3)
BUN SERPL-MCNC: 21 MG/DL (ref 5–24)
CALCIUM SERPL-MCNC: 8.5 MG/DL (ref 8.5–10.4)
CHLORIDE SERPLBLD-SCNC: 110 MMOL/L (ref 94–109)
CHOLEST SERPL-MCNC: 167 MG/DL (ref 0–200)
CHOLEST/HDLC SERPL: 3.7 {RATIO} (ref 0–5)
CO2 SERPL-SCNC: 28 MMOL/L (ref 20–32)
CREAT SERPL-MCNC: 0.9 MG/DL (ref 0.6–1.3)
EGFR CALCULATED (NON BLACK REFERENCE): 71
ERYTHROCYTE [DISTWIDTH] IN BLOOD BY AUTOMATED COUNT: 13.9 %
FASTING SPECIMEN: NO
GLUCOSE SERPL-MCNC: 85 MG/DL (ref 60–99)
HCT VFR BLD AUTO: 43.6 % (ref 35–47)
HDLC SERPL-MCNC: 46 MG/DL
HEMOGLOBIN: 14.6 G/DL (ref 11.7–15.7)
LDLC SERPL CALC-MCNC: 60 MG/DL (ref 0–129)
MCH RBC QN AUTO: 31.2 PG (ref 26.5–35)
MCHC RBC AUTO-ENTMCNC: 33.5 G/DL (ref 32–36)
MCV RBC AUTO: 93.2 FL (ref 78–100)
PLATELET # BLD AUTO: 237 K/UL (ref 150–450)
POTASSIUM SERPL-SCNC: 4 MMOL/L (ref 3.4–5.3)
PROT SERPL-MCNC: 6.8 G/DL (ref 6.8–8.8)
RBC # BLD AUTO: 4.68 M/UL (ref 3.8–5.2)
SODIUM SERPL-SCNC: 142 MMOL/L (ref 137.3–146.3)
TRIGL SERPL-MCNC: 306 MG/DL (ref 0–150)
VLDL-CHOLESTEROL: 61 (ref 7–32)
WBC # BLD AUTO: 5.2 K/UL (ref 4–11)

## 2021-05-24 RX ORDER — ESCITALOPRAM OXALATE 20 MG/1
20 TABLET ORAL DAILY
COMMUNITY
End: 2024-06-30

## 2021-05-24 ASSESSMENT — ANXIETY QUESTIONNAIRES
1. FEELING NERVOUS, ANXIOUS, OR ON EDGE: NOT AT ALL
GAD7 TOTAL SCORE: 0
IF YOU CHECKED OFF ANY PROBLEMS ON THIS QUESTIONNAIRE, HOW DIFFICULT HAVE THESE PROBLEMS MADE IT FOR YOU TO DO YOUR WORK, TAKE CARE OF THINGS AT HOME, OR GET ALONG WITH OTHER PEOPLE: NOT DIFFICULT AT ALL
6. BECOMING EASILY ANNOYED OR IRRITABLE: NOT AT ALL
7. FEELING AFRAID AS IF SOMETHING AWFUL MIGHT HAPPEN: NOT AT ALL
3. WORRYING TOO MUCH ABOUT DIFFERENT THINGS: NOT AT ALL
2. NOT BEING ABLE TO STOP OR CONTROL WORRYING: NOT AT ALL
5. BEING SO RESTLESS THAT IT IS HARD TO SIT STILL: NOT AT ALL

## 2021-05-24 ASSESSMENT — PATIENT HEALTH QUESTIONNAIRE - PHQ9
5. POOR APPETITE OR OVEREATING: NOT AT ALL
SUM OF ALL RESPONSES TO PHQ QUESTIONS 1-9: 4

## 2021-05-24 ASSESSMENT — MIFFLIN-ST. JEOR: SCORE: 1457.62

## 2021-05-24 NOTE — NURSING NOTE
"46 year old  Chief Complaint   Patient presents with     Physical       Blood pressure 129/82, pulse 81, temperature 97  F (36.1  C), resp. rate 14, height 1.65 m (5' 4.96\"), weight 82.7 kg (182 lb 6.4 oz), last menstrual period 05/19/2021, SpO2 96 %, not currently breastfeeding. Body mass index is 30.39 kg/m .  Patient Active Problem List   Diagnosis     Depression with anxiety     Herpes, genital     Alcohol dependence in remission (H)     Raynaud's disease without gangrene       Wt Readings from Last 2 Encounters:   05/24/21 82.7 kg (182 lb 6.4 oz)   12/23/20 87.5 kg (193 lb)     BP Readings from Last 3 Encounters:   05/24/21 129/82   01/08/20 128/88   01/24/18 146/83         Current Outpatient Medications   Medication     amLODIPine (NORVASC) 5 MG tablet     DULoxetine (CYMBALTA) 30 MG EC capsule     escitalopram (LEXAPRO) 20 MG tablet     ibuprofen (ADVIL,MOTRIN) 400 MG tablet     LYSINE HCL PO     QUEtiapine Fumarate (SEROQUEL PO)     valACYclovir (VALTREX) 500 MG tablet     No current facility-administered medications for this visit.        Social History     Tobacco Use     Smoking status: Former Smoker     Types: Cigarettes     Smokeless tobacco: Never Used     Tobacco comment: Previous social smoker   Substance Use Topics     Alcohol use: No     Comment: sober since 2/9/11     Drug use: No       Health Maintenance Due   Topic Date Due     ADVANCE CARE PLANNING  Never done     MAMMO SCREENING  Never done     COVID-19 Vaccine (1) Never done     HEPATITIS C SCREENING  Never done     DTAP/TDAP/TD IMMUNIZATION (3 - Td) 01/28/2020     PHQ-9  02/08/2020       Lab Results   Component Value Date    PAP NIL 01/24/2018         May 24, 2021 1:32 PM  "

## 2021-05-25 LAB — HCV AB SERPL QL IA: NONREACTIVE

## 2021-05-25 ASSESSMENT — ANXIETY QUESTIONNAIRES: GAD7 TOTAL SCORE: 0

## 2021-06-11 NOTE — TELEPHONE ENCOUNTER
FUTURE VISIT INFORMATION      FUTURE VISIT INFORMATION:    Date: 8/17/21    Time: 8:30 AM    Location: [facility]  REFERRAL INFORMATION:    Referring provider:  Dr. Mahsa Nunez    Referring providers clinic:  [facility]    Reason for visit/diagnosis: Breast Reduction    RECORDS REQUESTED FROM:       Clinic name Comments Records Status Imaging Status   [facility] 5/24/21 - [facility] OV with Dr. Nunez [facility]    [facility] ([facility] 6/25/21 - MAMMO Epic PACs

## 2021-08-17 ENCOUNTER — PRE VISIT (OUTPATIENT)
Dept: PLASTIC SURGERY | Facility: CLINIC | Age: 46
End: 2021-08-17

## 2021-09-13 ENCOUNTER — VIRTUAL VISIT (OUTPATIENT)
Dept: FAMILY MEDICINE | Facility: CLINIC | Age: 46
End: 2021-09-13
Payer: MEDICARE

## 2021-09-13 VITALS — BODY MASS INDEX: 29.16 KG/M2 | HEIGHT: 65 IN | WEIGHT: 175 LBS

## 2021-09-13 DIAGNOSIS — M79.18 MYALGIA OF MUSCLE OF NECK: ICD-10-CM

## 2021-09-13 DIAGNOSIS — M48.02 SPINAL STENOSIS OF CERVICAL REGION: Primary | ICD-10-CM

## 2021-09-13 RX ORDER — CYCLOBENZAPRINE HCL 10 MG
TABLET ORAL
Qty: 30 TABLET | Refills: 0 | Status: SHIPPED | OUTPATIENT
Start: 2021-09-13 | End: 2022-08-01

## 2021-09-13 ASSESSMENT — MIFFLIN-ST. JEOR: SCORE: 1424.67
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
## 2021-09-19 ENCOUNTER — HEALTH MAINTENANCE LETTER (OUTPATIENT)
Age: 46
End: 2021-09-19

## 2021-10-18 NOTE — TELEPHONE ENCOUNTER
FUTURE VISIT INFORMATION      FUTURE VISIT INFORMATION:    Date: 1/14/22    Time: 10:00am    Location: [facility]  REFERRAL INFORMATION:    Referring provider:  Madyson Nunez MD    Referring providers clinic:  [facility]    Reason for visit/diagnosis  breast reduction     RECORDS REQUESTED FROM:       Clinic name Comments Records Status Imaging Status   [facility] Virtual visit 9/13 EPIC

## 2021-11-10 ENCOUNTER — VIRTUAL VISIT (OUTPATIENT)
Dept: FAMILY MEDICINE | Facility: CLINIC | Age: 46
End: 2021-11-10
Payer: MEDICARE

## 2021-11-10 DIAGNOSIS — F41.8 DEPRESSION WITH ANXIETY: ICD-10-CM

## 2021-11-10 DIAGNOSIS — Z71.2 ENCOUNTER TO DISCUSS TEST RESULTS: Primary | ICD-10-CM

## 2021-11-10 RX ORDER — DULOXETIN HYDROCHLORIDE 30 MG/1
60 CAPSULE, DELAYED RELEASE ORAL 2 TIMES DAILY
Qty: 60 CAPSULE | Refills: 0 | COMMUNITY
Start: 2021-11-10 | End: 2024-06-30

## 2021-11-10 RX ORDER — QUETIAPINE FUMARATE 50 MG/1
50 TABLET, FILM COATED ORAL 2 TIMES DAILY
Qty: 270 TABLET | Refills: 0 | COMMUNITY
Start: 2021-11-10 | End: 2024-07-01

## 2021-11-10 ASSESSMENT — ANXIETY QUESTIONNAIRES
GAD7 TOTAL SCORE: 20
2. NOT BEING ABLE TO STOP OR CONTROL WORRYING: NEARLY EVERY DAY
3. WORRYING TOO MUCH ABOUT DIFFERENT THINGS: NEARLY EVERY DAY
6. BECOMING EASILY ANNOYED OR IRRITABLE: NEARLY EVERY DAY
7. FEELING AFRAID AS IF SOMETHING AWFUL MIGHT HAPPEN: NEARLY EVERY DAY
GAD7 TOTAL SCORE: 20
GAD7 TOTAL SCORE: 20
1. FEELING NERVOUS, ANXIOUS, OR ON EDGE: MORE THAN HALF THE DAYS
7. FEELING AFRAID AS IF SOMETHING AWFUL MIGHT HAPPEN: NEARLY EVERY DAY
4. TROUBLE RELAXING: NEARLY EVERY DAY
5. BEING SO RESTLESS THAT IT IS HARD TO SIT STILL: NEARLY EVERY DAY
8. IF YOU CHECKED OFF ANY PROBLEMS, HOW DIFFICULT HAVE THESE MADE IT FOR YOU TO DO YOUR WORK, TAKE CARE OF THINGS AT HOME, OR GET ALONG WITH OTHER PEOPLE?: EXTREMELY DIFFICULT

## 2021-11-10 ASSESSMENT — PATIENT HEALTH QUESTIONNAIRE - PHQ9
10. IF YOU CHECKED OFF ANY PROBLEMS, HOW DIFFICULT HAVE THESE PROBLEMS MADE IT FOR YOU TO DO YOUR WORK, TAKE CARE OF THINGS AT HOME, OR GET ALONG WITH OTHER PEOPLE: EXTREMELY DIFFICULT
SUM OF ALL RESPONSES TO PHQ QUESTIONS 1-9: 19
SUM OF ALL RESPONSES TO PHQ QUESTIONS 1-9: 19

## 2021-11-10 NOTE — PROGRESS NOTES
Susannah is a 46 year old who is being evaluated via a billable video visit.      How would you like to obtain your AVS? MyChart  Will anyone else be joining your video visit? No    Video Start Time: 4:50 PM   Video End Time: 5:07 PM  Total video time: 17 minutes    Assessment & Plan     (Z71.2) Encounter to discuss test results  (primary encounter diagnosis)  Comment: Susannah was concerned after reading brain MRI report from 10/2016, she is struggling with mental health issues at this time, concerned she needed follow up for incidental benign findings in the filiberto  Plan: Reassurance, reviewed report from [facility] Everywhere during our visit today. No further imaging or workup is indicated.     (F41.8) Depression with anxiety  Comment: current flare, no relapse with alcohol, meets regularly with counselor and mental health provider that prescribes medication. She is planning to return to AA meeting tonight as she has been missing these.  Plan: Supported Susannah, praise for not relapsing with Etoh in face of adversity. Support her decision to attend AA meetings. Would support time off work if needed. She will discuss with her mental health provider as well.     24 minutes spend on the date of this encounter doing chart review, history and exam, documentation and further activities as noted above.     Madyson Nunez MD  Internal Medicine/Pediatrics      I, Yee Sandoval, am serving as a scribe to document services personally performed by Dr. Madyson Nunez, based on data collection and the provider's statements to me. Dr. Nunez has reviewed, edited, and approved the above note.       Subjective   Susannah is a 46 year old who presents for the following health issues    HPI     Brain MRI w/o contrast - 10/11/16  Clinical History: Neck pain.   Comparison: No priors available.        Findings:  The brain, brain stem and ventricular system are morphologically normal, without mass or mass effect.  There are a few small foci of increased FLAIR  "signal scattered in the white matter, as well as mild mottled FLAIR signal changes in the filiberto, nonspecific in character and distribution likely due to chronic small vessel disease. Brain parenchymal signal pattern is otherwise unremarkable, without evidence of acute ischemic change or acute intracranial hemorrhage.  Skull base and [place] vessel flow voids are patent.  Paranasal sinuses and mastoid air cells are without significant mucosal thickening or fluid, trace of fluid signal in the left mastoid air cells incidentally noted.         Impression:  Brain without significant MRI abnormality.   Author: LENIN KRUSE M.D.    Discuss above test results  Susannah would like to review the results of a brain MRI done in 2016. Specifically, she is concerned about the signal changes noted in her filiberto.  She is not sure what this means or what should be done about this, if anything, and this is wearing on her. She had a cervical spine MRI on 10/28/21 which again noted signal changes in the filiberto, not significantly changed. Susannah also endorses having headaches. She is wondering if this could be contributing to her addiction/sobriety struggles, difficulty in relationships, and difficulty concentrating. 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 She has relapsed with using medical marijuana as she feels it helps her concentrate.     Susannah has chronic neck pain and had planned to pursue surgery, but her surgeon recommended more extensive procedure, which would require longer recovery time.  She is not sure if she can take that much time to recuperate and for now has cancelled the procedure.     Depression and anxiety  Susannah is \"feeling okay in spurts\". She has a new partner and feels insecure in their relationship when she is struggling with her mental health, fear of losing him. Seeing a counselor from [facility]. She is also seeing a mental health provider, GHADA Carrington, who " "prescribes medications. Susannah is taking escitalopram 20 mg daily and duloxetine 60 mg daily. She also takes Seroquil  mg TID and 300 mg at night. Parents are supportive, has reached out to friends (Maria Luisa and Montserratjohn).     It has been more difficult for Susannah to get through the work day and she has started working remotely on Fridays. She would like to transition to part-time work as working 5 days per week has been difficult since becoming sober. She is \"terrified\" with discussing this, but is planning on having a conversation with her boss after meeting with Felicity. Their next appointment in later this month.  No current SI.    PHQ 1/8/2020 5/24/2021 11/10/2021   PHQ-9 Total Score 10 4 19   Q9: Thoughts of better off dead/self-harm past 2 weeks Not at all Not at all Not at all     FRANK-7 SCORE 2/6/2018 5/24/2021 11/10/2021   Total Score - - -   Total Score - - 20 (severe anxiety)   Total Score 18 0 20       Hx of alcohol dependence in remission  Susannah reports that she is struggling in her sobriety. She has not had alcohol in 11 years. She has reached out to women's AA group to discuss skipping meetings, having cravings for marijuana, and trying medical marijuana. Notes that medical marijuana has helped her concentrate, as above.     Review of Systems   Constitutional, HEENT, cardiovascular, pulmonary, gi and gu systems are negative, except as otherwise noted.      Objective    Vitals - Patient Reported  Weight (Patient Reported): 76.7 kg (169 lb)      Vitals:  No vitals were obtained today due to virtual visit.    Physical Exam   GENERAL: alert, appears stressed  EYES: Eyes grossly normal to inspection.   RESP: No visible retractions or increased work of breathing.    SKIN: Visible skin clear.   NEURO:  Mentation and speech appropriate  PSYCH:  affect is sad, stressed.         Video-Visit Details    Type of service:  Video Visit    Video End Time:5:07 PM    Originating Location (pt. Location): Home    Distant " Location (provider location):  [facility]     Platform used for Video Visit: DeandreWell  Answers for HPI/ROS submitted by the patient on 11/10/2021  If you checked off any problems, how difficult have these problems made it for you to do your work, take care of things at home, or get along with other people?: Extremely difficult  PHQ9 TOTAL SCORE: 19  FRANK 7 TOTAL SCORE: 20

## 2021-11-11 ASSESSMENT — PATIENT HEALTH QUESTIONNAIRE - PHQ9: SUM OF ALL RESPONSES TO PHQ QUESTIONS 1-9: 19

## 2021-11-11 ASSESSMENT — ANXIETY QUESTIONNAIRES: GAD7 TOTAL SCORE: 20

## 2021-11-17 DIAGNOSIS — A60.00 GENITAL HERPES SIMPLEX, UNSPECIFIED SITE: ICD-10-CM

## 2021-11-17 RX ORDER — VALACYCLOVIR HYDROCHLORIDE 500 MG/1
TABLET, FILM COATED ORAL
Qty: 90 TABLET | Refills: 1 | Status: SHIPPED | OUTPATIENT
Start: 2021-11-17 | End: 2022-05-12

## 2021-11-17 NOTE — TELEPHONE ENCOUNTER
Valacyclovir (Valtrex) 500 mg    Last Office Visit: 11/10/21  Future [facility] [place]ments: None  Medication last refilled: 12/23/20 #90 with 3 refill(s)    Required labs per protocol:    DRUG REF RANGE 1/24/18 5/24/21   Creatinine 0.8-1.25 mg/dL 0.8 0.9     Prescription approved per [facility] Refill Protocol.  Had discussion with Susannah about how she is taking medication.  Susannah admitted to a couple of months of stress and would forget if she took the medication and would take it again (basically twice daily) so she is now out of refills.  Talked about using a pill box or phone reminder and to take it at the same time every day and not more than once daily.  Also, educated on if she does have breakthrough symptoms or an outbreak, she is to call for an appointment as she will then need a separate prescription to treat that outbreak.  Susannah verbalized understanding.    Portia Neil, RN, BSN

## 2021-11-17 NOTE — TELEPHONE ENCOUNTER
Who is calling? Patient  Medication name: valACYclovir (VALTREX) 500 MG tablet  Is this a refill request? Yes  Have they contacted the pharmacy?  Yes  Pharmacy:   [id number] - [place] - [address]  [address] [address]  Phone: [phone] Fax: [phone]  Question/Concern: refill request - state she will be out tomorrow   Would patient like a call back? Yes to confirm once sent

## 2021-12-13 ENCOUNTER — TELEPHONE (OUTPATIENT)
Dept: PLASTIC SURGERY | Facility: CLINIC | Age: 46
End: 2021-12-13
Payer: MEDICARE

## 2021-12-13 NOTE — TELEPHONE ENCOUNTER
lvm to RESCHEDULE 1/14 appt with LIS JASSO, [facility]

## 2022-01-07 DIAGNOSIS — I73.00 RAYNAUD'S DISEASE WITHOUT GANGRENE: ICD-10-CM

## 2022-01-07 RX ORDER — AMLODIPINE BESYLATE 5 MG/1
5 TABLET ORAL DAILY
Qty: 90 TABLET | Refills: 0 | Status: SHIPPED | OUTPATIENT
Start: 2022-01-07 | End: 2022-01-21

## 2022-01-07 NOTE — TELEPHONE ENCOUNTER
Last time prescribed: 12/23/20 , 90 tabs/caps x 3 refills  Last office visit: 11/10/21  Next appointment: No Future Appointment Scheduled

## 2022-01-07 NOTE — TELEPHONE ENCOUNTER
Amlodipine (Norvasc) 5 mg    Last Office Visit: 11/10/21  [place] [facility]  Medication last refilled: 12/23/20 #90 with 3 refill(s)    Vital Signs 1/24/2018 1/8/2020 1/8/2020 12/23/2020 5/24/2021   Systolic 146 137 128  129   Diastolic 83 95 88  82     Required labs per protocol:    DRUG REF RANGE 5/24/21 7/26/21   Creatinine 0.8-1.25 mg/dL 0.9 0.72     Prescription approved per [facility] Refill Protocol.    Portia Neil, RN, BSN

## 2022-01-14 ENCOUNTER — PRE VISIT (OUTPATIENT)
Dept: PLASTIC SURGERY | Facility: CLINIC | Age: 47
End: 2022-01-14

## 2022-03-05 ENCOUNTER — HEALTH MAINTENANCE LETTER (OUTPATIENT)
Age: 47
End: 2022-03-05

## 2022-05-12 DIAGNOSIS — A60.00 GENITAL HERPES SIMPLEX, UNSPECIFIED SITE: ICD-10-CM

## 2022-05-12 RX ORDER — VALACYCLOVIR HYDROCHLORIDE 500 MG/1
TABLET, FILM COATED ORAL
Qty: 90 TABLET | Refills: 1 | Status: SHIPPED | OUTPATIENT
Start: 2022-05-12 | End: 2022-12-16

## 2022-05-12 NOTE — TELEPHONE ENCOUNTER
Valacyclovir (Valtrex) 500 mg    Last Office Visit: 11/2/21  Future [facility] Appointments: None  Medication last refilled: 11/17/21 #90 with 1 refill(s)    Required labs per protocol:    LAB REF RANGE 10/11/19 7/26/21   Creatinine 0.8-1.25 mg/dL 0.77 0.72     Prescription approved per [facility] Refill Protocol.    PRIYANK CardosoN, RN, CCM

## 2022-05-12 NOTE — TELEPHONE ENCOUNTER
Last time prescribed: 11/17/21 , 90 tabs/caps x 1 refills  Last office visit: 11/10/21  Next appointment: No Future Appointment Scheduled

## 2022-06-25 ENCOUNTER — HEALTH MAINTENANCE LETTER (OUTPATIENT)
Age: 47
End: 2022-06-25

## 2022-08-01 ENCOUNTER — OFFICE VISIT (OUTPATIENT)
Dept: FAMILY MEDICINE | Facility: CLINIC | Age: 47
End: 2022-08-01
Payer: MEDICARE

## 2022-08-01 VITALS
BODY MASS INDEX: 29.33 KG/M2 | WEIGHT: 176.04 LBS | HEART RATE: 83 BPM | DIASTOLIC BLOOD PRESSURE: 90 MMHG | HEIGHT: 65 IN | SYSTOLIC BLOOD PRESSURE: 122 MMHG | OXYGEN SATURATION: 92 % | TEMPERATURE: 97.9 F

## 2022-08-01 DIAGNOSIS — F41.8 DEPRESSION WITH ANXIETY: ICD-10-CM

## 2022-08-01 DIAGNOSIS — I10 BENIGN ESSENTIAL HYPERTENSION: ICD-10-CM

## 2022-08-01 DIAGNOSIS — Z00.00 ROUTINE GENERAL MEDICAL EXAMINATION AT A HEALTH CARE FACILITY: Primary | ICD-10-CM

## 2022-08-01 DIAGNOSIS — N95.1 PERIMENOPAUSE: ICD-10-CM

## 2022-08-01 DIAGNOSIS — I73.00 RAYNAUD'S DISEASE WITHOUT GANGRENE: ICD-10-CM

## 2022-08-01 DIAGNOSIS — Z13.220 LIPID SCREENING: ICD-10-CM

## 2022-08-01 LAB
ALBUMIN SERPL BCG-MCNC: 4.4 G/DL (ref 3.5–5.2)
ALP SERPL-CCNC: 40 U/L (ref 35–104)
ALT SERPL W P-5'-P-CCNC: 19 U/L (ref 10–35)
ANION GAP SERPL CALCULATED.3IONS-SCNC: 8 MMOL/L (ref 7–15)
AST SERPL W P-5'-P-CCNC: 24 U/L (ref 10–35)
BILIRUB SERPL-MCNC: 0.3 MG/DL
BUN SERPL-MCNC: 13.8 MG/DL (ref 6–20)
CALCIUM SERPL-MCNC: 9.2 MG/DL (ref 8.6–10)
CHLORIDE SERPL-SCNC: 101 MMOL/L (ref 98–107)
CHOLEST SERPL-MCNC: 165 MG/DL
CREAT SERPL-MCNC: 0.69 MG/DL (ref 0.51–0.95)
DEPRECATED HCO3 PLAS-SCNC: 29 MMOL/L (ref 22–29)
GFR SERPL CREATININE-BSD FRML MDRD: >90 ML/MIN/1.73M2
GLUCOSE SERPL-MCNC: 90 MG/DL (ref 70–99)
HDLC SERPL-MCNC: 60 MG/DL
LDLC SERPL CALC-MCNC: 91 MG/DL
NONHDLC SERPL-MCNC: 105 MG/DL
POTASSIUM SERPL-SCNC: 4.1 MMOL/L (ref 3.4–5.3)
PROT SERPL-MCNC: 6.8 G/DL (ref 6.4–8.3)
SODIUM SERPL-SCNC: 138 MMOL/L (ref 136–145)
TRIGL SERPL-MCNC: 72 MG/DL

## 2022-08-01 PROCEDURE — 80053 COMPREHEN METABOLIC PANEL: CPT | Performed by: INTERNAL MEDICINE

## 2022-08-01 PROCEDURE — 80061 LIPID PANEL: CPT | Performed by: INTERNAL MEDICINE

## 2022-08-01 PROCEDURE — 82040 ASSAY OF SERUM ALBUMIN: CPT | Performed by: INTERNAL MEDICINE

## 2022-08-01 RX ORDER — AMLODIPINE BESYLATE 10 MG/1
10 TABLET ORAL DAILY
Qty: 90 TABLET | Refills: 1 | Status: SHIPPED | OUTPATIENT
Start: 2022-08-01 | End: 2023-02-09

## 2022-08-01 RX ORDER — OMEGA-3 FATTY ACIDS/FISH OIL 300-1000MG
CAPSULE ORAL
Qty: 90 CAPSULE | Refills: 3
Start: 2022-08-01

## 2022-08-01 RX ORDER — NORETHINDRONE ACETATE AND ETHINYL ESTRADIOL 1MG-20(21)
1 KIT ORAL DAILY
Qty: 84 TABLET | Refills: 3 | Status: SHIPPED | OUTPATIENT
Start: 2022-08-01 | End: 2023-03-03

## 2022-08-01 ASSESSMENT — ANXIETY QUESTIONNAIRES
5. BEING SO RESTLESS THAT IT IS HARD TO SIT STILL: NEARLY EVERY DAY
3. WORRYING TOO MUCH ABOUT DIFFERENT THINGS: NEARLY EVERY DAY
GAD7 TOTAL SCORE: 21
IF YOU CHECKED OFF ANY PROBLEMS ON THIS QUESTIONNAIRE, HOW DIFFICULT HAVE THESE PROBLEMS MADE IT FOR YOU TO DO YOUR WORK, TAKE CARE OF THINGS AT HOME, OR GET ALONG WITH OTHER PEOPLE: VERY DIFFICULT
7. FEELING AFRAID AS IF SOMETHING AWFUL MIGHT HAPPEN: NEARLY EVERY DAY
GAD7 TOTAL SCORE: 21
2. NOT BEING ABLE TO STOP OR CONTROL WORRYING: NEARLY EVERY DAY
6. BECOMING EASILY ANNOYED OR IRRITABLE: NEARLY EVERY DAY
1. FEELING NERVOUS, ANXIOUS, OR ON EDGE: NEARLY EVERY DAY

## 2022-08-01 ASSESSMENT — PATIENT HEALTH QUESTIONNAIRE - PHQ9
5. POOR APPETITE OR OVEREATING: NEARLY EVERY DAY
SUM OF ALL RESPONSES TO PHQ QUESTIONS 1-9: 12
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
## 2022-08-01 NOTE — LETTER
August 1, 2022    RE: Susannah Goddard  [address] [place] [id number]      To whom it may concern,    I am the primary care care provider for Susannah Goddard.  Due to medical concerns, I would recommend that Susannah be allowed to work from home four days a week indefinitely.    Sincerely,          Madyson Nunez MD  Internal Medicine/Pediatrics

## 2022-08-01 NOTE — COMMUNITY RESOURCES LIST (ENGLISH)
08/01/2022   [facility] - Outpatient Clinics  N/A  For questions about this resource list or additional care needs, please contact your primary care clinic or care manager.  Phone: [phone]   Email: N/A   Address: [address] [place] [address]   Hours: N/A        Mental Health       Individual counseling  1  [facility] Distance: 1.42 miles      COVID-19 Status: Phone/Virtual   [address] [facility] [address] [place] [address]  Language: English, Rwandan  Hours: Mon - Fri 8:00 AM - 4:30 PM  Fees: Insurance, Self Pay   Phone: [phone] [phone] Website: [email or web address]     2  [facility] Distance: 1.89 miles      COVID-19 Status: Regular Operations, COVID-19 Status: Phone/Virtual   [address]  [place] [address]  Language: English  Hours: Mon 8:00 AM - 5:00 PM , Wed - Thu 8:00 AM - 5:00 PM  Fees: Insurance, Self Pay, Sliding Fee   Phone: [phone]) [phone] Email: [email or web address] Website: [email or web address]          Important Numbers & Websites       Emergency Services   911  [facility] Services   311  Poison Control   (772) 368-8512  Suicide Prevention Lifeline   (870) 971-6454 (TALK)  Child Abuse Hotline   (198) 540-3748 (4-A-Child)  Sexual Assault Hotline   (634) 701-5647 (HOPE)  National Runaway Safeline   (344) 477-4175 (RUNAWAY)  All-Options Talkline   (804) 549-2643  Substance Abuse Referral   (769) 816-2239 (HELP)

## 2022-08-01 NOTE — PATIENT INSTRUCTIONS
Daily fiber, metamucil and benefiber    Calcium 1200mg daily  Vitamin D 1000 international unit(s) daily    Preventive Health Recommendations  Female Ages 40 to 49    Yearly exam:   See your health care provider every year in order to  Review health changes.   Discuss preventive care.    Review your medicines if your doctor prescribed any.    Get a Pap test every three years (unless you have an abnormal result and your provider advises testing more often).    If you get Pap tests with HPV test, you only need to test every 5 years, unless you have an abnormal result. You do not need a Pap test if your uterus was removed (hysterectomy) and you have not had cancer.    You should be tested each year for STDs (sexually transmitted diseases), if you're at risk.   Ask your doctor if you should have a mammogram.    Have a colonoscopy (test for colon cancer) if someone in your family has had colon cancer or polyps before age 50.     Have a cholesterol test every 5 years.     Have a diabetes test (fasting glucose) after age 45. If you are at risk for diabetes, you should have this test every 3 years.    Shots: Get a flu shot each year. Get a tetanus shot every 10 years.     Nutrition:   Eat at least 5 servings of fruits and vegetables each day.  Eat whole-grain bread, whole-wheat pasta and brown rice instead of white grains and rice.  Get adequate Calcium and Vitamin D.      Lifestyle  Exercise at least 150 minutes a week (an average of 30 minutes a day, 5 days a week). This will help you control your weight and prevent disease.  Limit alcohol to one drink per day.  No smoking.   Wear sunscreen to prevent skin cancer.  See your dentist every six months for an exam and cleaning.

## 2022-11-20 ENCOUNTER — HEALTH MAINTENANCE LETTER (OUTPATIENT)
Age: 47
End: 2022-11-20

## 2022-12-15 DIAGNOSIS — A60.00 GENITAL HERPES SIMPLEX, UNSPECIFIED SITE: ICD-10-CM

## 2022-12-15 NOTE — TELEPHONE ENCOUNTER
Pt has been out of meds a few days    Melody    Last visit 8/1/22, no future visit  Prescription approved per [facility] Refill Protocol.  Vicki Mary RN  [facility]

## 2022-12-16 RX ORDER — VALACYCLOVIR HYDROCHLORIDE 500 MG/1
TABLET, FILM COATED ORAL
Qty: 90 TABLET | Refills: 1 | Status: SHIPPED | OUTPATIENT
Start: 2022-12-16 | End: 2023-03-03

## 2023-02-09 DIAGNOSIS — I73.00 RAYNAUD'S DISEASE WITHOUT GANGRENE: ICD-10-CM

## 2023-02-09 DIAGNOSIS — I10 BENIGN ESSENTIAL HYPERTENSION: ICD-10-CM

## 2023-02-09 RX ORDER — AMLODIPINE BESYLATE 10 MG/1
10 TABLET ORAL DAILY
Qty: 30 TABLET | Refills: 0 | Status: SHIPPED | OUTPATIENT
Start: 2023-02-09 | End: 2023-03-03

## 2023-02-09 NOTE — TELEPHONE ENCOUNTER
Amlodipine (Norvasc) 10 mg    Last Office Visit: 8/1/22  [facility] Appointments: None  Medication last refilled: 8/1/22 #90 with 1 refill(s)    Vital Signs 1/8/2020 5/24/2021 8/1/2022   Systolic 128 129 122   Diastolic 88 82 90     Required labs per protocol:    LAB REF RANGE 7/26/21 8/1/22   Creatinine 0.8-1.25 mg/dL 0.72 0.69   Potassium 3.4-5.3 mmol/L 4.4 4.1     Medication is being filled for 1 time refill only due to:  Patient needs to be seen because due for blood pressure follow up.     Typeform message sent to Susannah to call and schedule an appointment.    PRIYANK CardosoN, RN, CCM

## 2023-03-03 ENCOUNTER — OFFICE VISIT (OUTPATIENT)
Dept: FAMILY MEDICINE | Facility: CLINIC | Age: 48
End: 2023-03-03
Payer: MEDICARE

## 2023-03-03 VITALS
BODY MASS INDEX: 29.66 KG/M2 | DIASTOLIC BLOOD PRESSURE: 76 MMHG | TEMPERATURE: 98 F | SYSTOLIC BLOOD PRESSURE: 119 MMHG | RESPIRATION RATE: 15 BRPM | OXYGEN SATURATION: 98 % | WEIGHT: 178 LBS | HEIGHT: 65 IN | HEART RATE: 74 BPM

## 2023-03-03 DIAGNOSIS — I10 BENIGN ESSENTIAL HYPERTENSION: Primary | ICD-10-CM

## 2023-03-03 DIAGNOSIS — A60.00 GENITAL HERPES SIMPLEX, UNSPECIFIED SITE: ICD-10-CM

## 2023-03-03 DIAGNOSIS — F32.A ANXIETY AND DEPRESSION: ICD-10-CM

## 2023-03-03 DIAGNOSIS — M54.2 NECK AND SHOULDER PAIN: ICD-10-CM

## 2023-03-03 DIAGNOSIS — M25.519 NECK AND SHOULDER PAIN: ICD-10-CM

## 2023-03-03 DIAGNOSIS — F41.9 ANXIETY AND DEPRESSION: ICD-10-CM

## 2023-03-03 RX ORDER — AMLODIPINE BESYLATE 10 MG/1
10 TABLET ORAL DAILY
Qty: 90 TABLET | Refills: 3 | Status: SHIPPED | OUTPATIENT
Start: 2023-03-03 | End: 2024-02-28

## 2023-03-03 RX ORDER — VALACYCLOVIR HYDROCHLORIDE 500 MG/1
TABLET, FILM COATED ORAL
Qty: 90 TABLET | Refills: 3 | Status: SHIPPED | OUTPATIENT
Start: 2023-03-03 | End: 2024-06-17

## 2023-03-03 ASSESSMENT — ANXIETY QUESTIONNAIRES
GAD7 TOTAL SCORE: 6
7. FEELING AFRAID AS IF SOMETHING AWFUL MIGHT HAPPEN: NOT AT ALL
6. BECOMING EASILY ANNOYED OR IRRITABLE: SEVERAL DAYS
GAD7 TOTAL SCORE: 6
5. BEING SO RESTLESS THAT IT IS HARD TO SIT STILL: SEVERAL DAYS
1. FEELING NERVOUS, ANXIOUS, OR ON EDGE: SEVERAL DAYS
2. NOT BEING ABLE TO STOP OR CONTROL WORRYING: SEVERAL DAYS
3. WORRYING TOO MUCH ABOUT DIFFERENT THINGS: SEVERAL DAYS
IF YOU CHECKED OFF ANY PROBLEMS ON THIS QUESTIONNAIRE, HOW DIFFICULT HAVE THESE PROBLEMS MADE IT FOR YOU TO DO YOUR WORK, TAKE CARE OF THINGS AT HOME, OR GET ALONG WITH OTHER PEOPLE: SOMEWHAT DIFFICULT

## 2023-03-03 ASSESSMENT — PATIENT HEALTH QUESTIONNAIRE - PHQ9
5. POOR APPETITE OR OVEREATING: SEVERAL DAYS
SUM OF ALL RESPONSES TO PHQ QUESTIONS 1-9: 6

## 2023-03-03 NOTE — NURSING NOTE
"47 year old  Chief Complaint   Patient presents with     Recheck Medication     Refill medication, BP Check, referral for breast reduction.        Blood pressure 119/76, pulse 74, temperature 98  F (36.7  C), temperature source Skin, resp. rate 15, height 1.651 m (5' 5\"), weight 80.7 kg (178 lb), last menstrual period 02/08/2023, SpO2 98 %, not currently breastfeeding. Body mass index is 29.62 kg/m .  Patient Active Problem List   Diagnosis     Depression with anxiety     Herpes, genital     Alcohol dependence in remission (H)     Raynaud's disease without gangrene       Wt Readings from Last 2 Encounters:   03/03/23 80.7 kg (178 lb)   08/01/22 79.9 kg (176 lb 0.6 oz)     BP Readings from Last 3 Encounters:   03/03/23 119/76   08/01/22 (!) 122/90   05/24/21 129/82         Current Outpatient Medications   Medication     amLODIPine (NORVASC) 10 MG tablet     DULoxetine (CYMBALTA) 30 MG capsule     escitalopram (LEXAPRO) 20 MG tablet     LYSINE HCL PO     omega 3 1000 MG CAPS     QUEtiapine (SEROQUEL) 50 MG tablet     valACYclovir (VALTREX) 500 MG tablet     norethindrone-ethinyl estradiol (JUNEL FE 1/20) 1-20 MG-MCG tablet     No current facility-administered medications for this visit.       Social History     Tobacco Use     Smoking status: Former     Types: Cigarettes     Smokeless tobacco: Never     Tobacco comments:     Previous social smoker   Vaping Use     Vaping Use: Never used   Substance Use Topics     Alcohol use: No     Comment: sober since 2/9/11     Drug use: No       Health Maintenance Due   Topic Date Due     ADVANCE CARE PLANNING  Never done     MAMMO SCREENING  Never done     COLORECTAL CANCER SCREENING  Never done     DTAP/TDAP/TD IMMUNIZATION (3 - Td or Tdap) 01/28/2020     COVID-19 Vaccine (2 - Moderna series) 09/08/2021     INFLUENZA VACCINE (1) 09/01/2022     HPV TEST  01/24/2023     PAP  01/24/2023     ZOSTER IMMUNIZATION (1 of 2) 03/14/2023     PHQ-9  04/03/2023       Lab Results   Component " Value Date    PAP NIL 01/24/2018         March 3, 2023 12:42 PM

## 2023-04-15 ENCOUNTER — HEALTH MAINTENANCE LETTER (OUTPATIENT)
Age: 48
End: 2023-04-15

## 2023-05-01 ENCOUNTER — TRANSFERRED RECORDS (OUTPATIENT)
Dept: HEALTH INFORMATION MANAGEMENT | Facility: CLINIC | Age: 48
End: 2023-05-01

## 2023-05-11 ENCOUNTER — HOSPITAL ENCOUNTER (OUTPATIENT)
Dept: MAMMOGRAPHY | Facility: CLINIC | Age: 48
Discharge: HOME | End: 2023-05-11
Attending: INTERNAL MEDICINE | Admitting: INTERNAL MEDICINE
Payer: MEDICARE

## 2023-05-11 ENCOUNTER — TELEPHONE (OUTPATIENT)
Dept: SURGERY | Facility: CLINIC | Age: 48
End: 2023-05-11
Payer: COMMERCIAL

## 2023-05-11 DIAGNOSIS — Z12.31 VISIT FOR SCREENING MAMMOGRAM: ICD-10-CM

## 2023-05-11 PROCEDURE — 77067 SCR MAMMO BI INCL CAD: CPT

## 2023-05-11 NOTE — TELEPHONE ENCOUNTER
PREVISIT INFORMATION                                                    Susannah Goddard scheduled for future visit at [facility] specialty clinics.    Patient is scheduled to see Dr. Ivory on 5/12/23  Reason for visit: breast reduction  Referring provider   Has patient seen previous specialist?   Medical Records:      REVIEW                                                      New patient packet mailed to patient:   Medication reconciliation complete:       Current Outpatient Medications   Medication Sig Dispense Refill     amLODIPine (NORVASC) 10 MG tablet Take 1 tablet (10 mg) by mouth daily 90 tablet 3     DULoxetine (CYMBALTA) 30 MG capsule Take 60 mg by mouth 2 times daily 60 capsule 0     escitalopram (LEXAPRO) 20 MG tablet Take 20 mg by mouth daily       LYSINE HCL PO Take 500 mg % by mouth daily Taken for cold sores        omega 3 1000 MG CAPS Take 1000mg daily 90 capsule 3     QUEtiapine (SEROQUEL) 50 MG tablet Take 50-100mg po TID and take 300mg po q hs, prescribed by psychiatry 270 tablet 0     valACYclovir (VALTREX) 500 MG tablet Take 1 po q day to prevent HSV outbreak 90 tablet 3       Allergies: Dilaudid [hydromorphone]      PLAN/FOLLOW-UP NEEDED                                                        Patient Reminders Given:  Please, make sure you bring an updated list of your medications.   If you are having a procedure, please, present 15 minutes early.  If you need to cancel or reschedule,please call [phone].    Mariam Holman

## 2023-05-12 ENCOUNTER — OFFICE VISIT (OUTPATIENT)
Dept: SURGERY | Facility: CLINIC | Age: 48
End: 2023-05-12
Attending: INTERNAL MEDICINE
Payer: COMMERCIAL

## 2023-05-12 VITALS — HEIGHT: 65 IN | BODY MASS INDEX: 29.76 KG/M2 | WEIGHT: 178.6 LBS

## 2023-05-12 DIAGNOSIS — N62 BREAST HYPERTROPHY: Primary | ICD-10-CM

## 2023-05-12 PROCEDURE — 99204 OFFICE O/P NEW MOD 45 MIN: CPT | Performed by: PLASTIC SURGERY

## 2023-05-12 NOTE — LETTER
2023         RE: Susannah Goddard  [address] [address]        Dear Colleague,    Thank you for referring your patient, Susannah Goddard, to the [facility]. Please see a copy of my visit note below.    REFERRING PROVIDER:  Madyson Nunez MD     PRESENTING COMPLAINT:  Consultation for breast reduction.    HISTORY OF PRESENTING COMPLAINT:  Ms. Goddard is 48 years old.  She has been thinking about a breast reduction for some time.  She has a lot of upper back, neck, shoulder pain, shoulder grooving from bra straps, inframammary fold rashes during summers.  She has used all sorts of supportive garments as well as over-the-counter pain medication without continued relief.  Her last mammogram was 2023.  It was BI-RADS 1.  Here to discuss options for breast reduction.    PAST MEDICAL HISTORY:  Cervical spine stenosis, hypertension.  History of alcohol and opioid dependence, sober since .    PAST SURGICAL HISTORY:  , cholecystectomy.    MEDICATIONS:  Amlodipine, duloxetine, quetiapine, valacyclovir.     ALLERGIES:  Dilaudid, ibuprofen and Tylenol.    SOCIAL HISTORY:  Does not smoke, does not drink.  Smokes medical marijuana.    REVIEW OF SYSTEMS:  Denies chest pain, shortness of breath, MI, CVA, DVT and PE.    PHYSICAL EXAMINATION:  Vital signs stable.  She is afebrile, in no obvious distress.  She is 5 feet 5 inches, 178 pounds, BMI of 29.7 kg/m2, body surface area 1.9 m2.  On examination of her breasts, she has grade 2 on 3 ptosis, left side is longer and larger than the right.  Sternal notch to nipple distance under 35 cm.    ASSESSMENT AND PLAN:  Based on above findings, a diagnosis of symptomatic bilateral breast hypertrophy was made.  I had a ender discussion with the patient about breast reduction in the presence of my nurse, as well as my med student.  I went over all the concepts of a breast reduction, showed her where the scars would be.  All risks, benefits  and alternatives including pain, infection, bleeding, scarring, asymmetry, seromas, hematomas, wound breakdown, wound dehiscence, prominent scar, hypertrophic scar, keloid scar, skin necrosis, fat necrosis, nipple necrosis, T junction site necrosis, numbness, DVT, PE, MI, CVA, pneumonia, renal failure and death were all explained.  Asymmetries were guaranteed. Numbness of the nipples was explained, inability to breastfeed was explained and inability to promise a cup size was explained.  I showed her on her where the scars would be.  All questions were answered in detail.  Her Schnur scale is 527 grams.  I think we can give her what she is looking for.  She was happy with the visit.  I look forward to helping her out in the future.  We will get prior authorization and proceed as indicated.    Total time spent in the encounter today, including chart review, visit itself and post-visit paperwork, was 45 minutes.    CHRIS Ivory MD    cc:  Madyson Nunez MD  [address]  [address]          Again, thank you for allowing me to participate in the care of your patient.        Sincerely,        FLOYD Ivory MD

## 2023-05-12 NOTE — PROGRESS NOTES
REFERRING PROVIDER:  Madyson Nunez MD     PRESENTING COMPLAINT:  Consultation for breast reduction.    HISTORY OF PRESENTING COMPLAINT:  Ms. Goddard is 48 years old.  She has been thinking about a breast reduction for some time.  She has a lot of upper back, neck, shoulder pain, shoulder grooving from bra straps, inframammary fold rashes during summers.  She has used all sorts of supportive garments as well as over-the-counter pain medication without continued relief.  Her last mammogram was 2023.  It was BI-RADS 1.  Here to discuss options for breast reduction.    PAST MEDICAL HISTORY:  Cervical spine stenosis, hypertension.  History of alcohol and opioid dependence, sober since .    PAST SURGICAL HISTORY:  , cholecystectomy.    MEDICATIONS:  Amlodipine, duloxetine, quetiapine, valacyclovir.     ALLERGIES:  Dilaudid, ibuprofen and Tylenol.    SOCIAL HISTORY:  Does not smoke, does not drink.  Smokes medical marijuana.    REVIEW OF SYSTEMS:  Denies chest pain, shortness of breath, MI, CVA, DVT and PE.    PHYSICAL EXAMINATION:  Vital signs stable.  She is afebrile, in no obvious distress.  She is 5 feet 5 inches, 178 pounds, BMI of 29.7 kg/m2, body surface area 1.9 m2.  On examination of her breasts, she has grade 2 on 3 ptosis, left side is longer and larger than the right.  Sternal notch to nipple distance under 35 cm.    ASSESSMENT AND PLAN:  Based on above findings, a diagnosis of symptomatic bilateral breast hypertrophy was made.  I had a ender discussion with the patient about breast reduction in the presence of my nurse, as well as my med student.  I went over all the concepts of a breast reduction, showed her where the scars would be.  All risks, benefits and alternatives including pain, infection, bleeding, scarring, asymmetry, seromas, hematomas, wound breakdown, wound dehiscence, prominent scar, hypertrophic scar, keloid scar, skin necrosis, fat necrosis, nipple necrosis, T junction site  necrosis, numbness, DVT, PE, MI, CVA, pneumonia, renal failure and death were all explained.  Asymmetries were guaranteed. Numbness of the nipples was explained, inability to breastfeed was explained and inability to promise a cup size was explained.  I showed her on her where the scars would be.  All questions were answered in detail.  Her Schnur scale is 527 grams.  I think we can give her what she is looking for.  She was happy with the visit.  I look forward to helping her out in the future.  We will get prior authorization and proceed as indicated.    Total time spent in the encounter today, including chart review, visit itself and post-visit paperwork, was 45 minutes.    CHRIS Ivory MD    cc:  Madyson Nunez MD  [address]  [address]

## 2023-05-12 NOTE — NURSING NOTE
"Susannah Goddard's chief complaint for this visit includes:  Chief Complaint   Patient presents with     New Patient     Breast reduction // per pt // recs in Epic, CE     PCP: Madyson Nunez    Referring Provider:  Madyson Nunez MD  [address] [place] [id number]    Ht 1.651 m (5' 5\")   Wt 81 kg (178 lb 9.6 oz)   BMI 29.72 kg/m    Data Unavailable        Allergies   Allergen Reactions     Dilaudid [Hydromorphone]          Do you need any medication refills at today's visit? Phyllis alfonso Clinic Visit Facilitator- Surgical Specialties       "

## 2023-05-15 ENCOUNTER — TELEPHONE (OUTPATIENT)
Dept: PLASTIC SURGERY | Facility: CLINIC | Age: 48
End: 2023-05-15
Payer: COMMERCIAL

## 2023-05-15 NOTE — TELEPHONE ENCOUNTER
----- Message from FLOYD Ivory MD sent at 5/12/2023  4:35 PM CDT -----  Regarding: Update  Hi All,    PA needed for BRM. Once approved please let me know so I can place orders    Thanks    Newton

## 2023-05-19 NOTE — TELEPHONE ENCOUNTER
PB DOS: TBD Writer spoke with patient regarding needing documentation around detailed conservative treatment for at least 6 months. The patient states she will reach out to her PCP to get that documented in her chart.    Type of Procedure: BRM  CPT Codes: 06785-41  ICD10 Codes: N62  Surgeon/Ordering provider: Dr. Newton Ivory  Pre-cert/Authorization completed:  No PA Required  Payer: Medicare and Medicaid [place]  Spoke to Medicare CMS and DHS fee schedule (Medicaid only requires PA if Medicare denies the claim)  Ref. # / Auth #   Valid Dates:     This is not a guarantee of payment. Payment is subject to the patient's plan benefits, medical necessity and eligibility at the time services are rendered.       Indications for BRM coverage with Medicare:   Reduction mammaplasty is considered medically necessary:    When the patient has significant symptoms that have interfered with normal daily activities, despite conservative management, for at least 6 months, including at least one of the following criteria:  History of back and/or shoulder pain which adversely affects activities of daily living (ADLs) unrelieved by, e.g.:  conservative analgesia (e.g., such as NSAID, compresses, massage, etc.)  supportive measures (e.g., such as garments, back brace, etc.),  physical therapy  correction of obesity  History of significant arthritic changes in the cervical or upper thoracic spine, optimally managed with persistent symptoms and/or significant restriction of activity, e.g.:  Signs and symptoms of ulnar paresthesias  Cervicalgia  Torticollis  Acquired kyphosis  Signs and symptoms of:    intertrigonous maceration or infection of the inframammary skin (e.g., hyperpigmentation, bleeding, chronic moisture, and evidence of skin breakdown), refractory to dermatologic measures, or  shoulder grooving with skin irritation (e.g., areas of excoriation and breakdown) by appropriate supporting garment

## 2023-05-26 ENCOUNTER — MEDICAL CORRESPONDENCE (OUTPATIENT)
Dept: HEALTH INFORMATION MANAGEMENT | Facility: CLINIC | Age: 48
End: 2023-05-26
Payer: COMMERCIAL

## 2023-05-31 DIAGNOSIS — N62 HYPERTROPHY OF BREAST: Primary | ICD-10-CM

## 2023-05-31 RX ORDER — CEFAZOLIN SODIUM 2 G/50ML
2 SOLUTION INTRAVENOUS SEE ADMIN INSTRUCTIONS
Status: CANCELLED | OUTPATIENT
Start: 2023-05-31

## 2023-05-31 RX ORDER — CEFAZOLIN SODIUM 2 G/50ML
2 SOLUTION INTRAVENOUS
Status: CANCELLED | OUTPATIENT
Start: 2023-05-31

## 2023-05-31 NOTE — TELEPHONE ENCOUNTER
PB DOS: TBD *letter of conservative treatment is in the patient chart.    Type of Procedure: BRM  CPT Codes: 58200-98  ICD10 Codes: N62  Surgeon/Ordering provider: Dr. Newton Ivory  Pre-cert/Authorization completed: No PA Required  Payer: Medicare and Medicaid [place]  Spoke to Medicare CMS and DHS fee schedule (Medicaid only requires PA if Medicare denies the claim)  Ref. # / Auth #   Valid Dates:     This is not a guarantee of payment. Payment is subject to the patient's plan benefits, medical necessity and eligibility at the time services are rendered.       Indications for BRM coverage with Medicare:   Reduction mammaplasty is considered medically necessary:    When the patient has significant symptoms that have interfered with normal daily activities, despite conservative management, for at least 6 months, including at least one of the following criteria:  History of back and/or shoulder pain which adversely affects activities of daily living (ADLs) unrelieved by, e.g.:  conservative analgesia (e.g., such as NSAID, compresses, massage, etc.)  supportive measures (e.g., such as garments, back brace, etc.),  physical therapy  correction of obesity  History of significant arthritic changes in the cervical or upper thoracic spine, optimally managed with persistent symptoms and/or significant restriction of activity, e.g.:  Signs and symptoms of ulnar paresthesias  Cervicalgia  Torticollis  Acquired kyphosis  Signs and symptoms of:    intertrigonous maceration or infection of the inframammary skin (e.g., hyperpigmentation, bleeding, chronic moisture, and evidence of skin breakdown), refractory to dermatologic measures, or  shoulder grooving with skin irritation (e.g., areas of excoriation and breakdown) by appropriate supporting garment      No Prior Authorization Required with Medicare and only required with Medicaid if Medicare deneids the claim.  Letter for conservative treatment is in the patients chart for billing.  Please contact patient to schedule and have patient verify coverage and benefits with Medicare and Medicaid.

## 2023-06-08 ENCOUNTER — TELEPHONE (OUTPATIENT)
Dept: SURGERY | Facility: CLINIC | Age: 48
End: 2023-06-08
Payer: COMMERCIAL

## 2023-06-08 NOTE — TELEPHONE ENCOUNTER
Called and LVM for patient to schedule surgery with Dr. Ivory. Provided direct call back number [phone].      Ella Fontenot on 06/08/23 at 10:33 AM  Surgery Scheduling  Ph: [phone]

## 2023-06-13 PROBLEM — N62 HYPERTROPHY OF BREAST: Status: ACTIVE | Noted: 2023-06-13

## 2023-06-13 NOTE — TELEPHONE ENCOUNTER
Scheduled surgery for 9/1 in  with Dr. Ivory.    H&P with PCP within 30 days of the surgery date. Patient verbalized understanding H&P is needed.    Post-op scheduled for 9/13 with Dr. Ivory in [place].    PT declined pre-op consult with Dr. Ivory prior to surgery.    Writer will mail surgery packet.    Patient noted she has a letter from her doctor explaining why surgery is needed and said that it needs to be sent to her insurance company for the PA but she does not know who to send it to.      Ella Fontenot on 06/13/23 at 4:09 PM.  Surgery Scheduling  Ph: [phone]

## 2023-08-25 ENCOUNTER — TELEPHONE (OUTPATIENT)
Dept: PLASTIC SURGERY | Facility: CLINIC | Age: 48
End: 2023-08-25
Payer: COMMERCIAL

## 2023-08-25 NOTE — TELEPHONE ENCOUNTER
Received call from Jeannette TRUJILLO as well as a voicemail from patient on 8/25 regarding surgery on 9/1 with Dr. Ivory.    Patient is requesting to cancel surgery at this time. Per Jeannette TRUJILLO, patient told staff that now is not a good time for her.     Removed surgery from schedule. Post-op also cancelled.    Will notify clinic.  __    Winnie Rodriguez, Senior Perioperative Coordinator, on 8/25/2023  P: [phone]

## 2023-09-01 ENCOUNTER — HOSPITAL ENCOUNTER (OUTPATIENT)
Facility: AMBULATORY SURGERY CENTER | Age: 48
Discharge: HOME | End: 2023-09-01
Attending: PLASTIC SURGERY | Admitting: PLASTIC SURGERY
Payer: MEDICARE

## 2023-09-01 DIAGNOSIS — N62 HYPERTROPHY OF BREAST: ICD-10-CM

## 2023-09-16 ENCOUNTER — HEALTH MAINTENANCE LETTER (OUTPATIENT)
Age: 48
End: 2023-09-16

## 2024-02-28 ENCOUNTER — MYC REFILL (OUTPATIENT)
Dept: FAMILY MEDICINE | Facility: CLINIC | Age: 49
End: 2024-02-28

## 2024-02-28 DIAGNOSIS — I10 BENIGN ESSENTIAL HYPERTENSION: ICD-10-CM

## 2024-02-28 RX ORDER — AMLODIPINE BESYLATE 10 MG/1
10 TABLET ORAL DAILY
Qty: 90 TABLET | Refills: 0 | Status: SHIPPED | OUTPATIENT
Start: 2024-02-28 | End: 2024-06-10

## 2024-02-28 NOTE — TELEPHONE ENCOUNTER
Medication requested: amLODIPine (NORVASC) 10 MG tablet   Last office visit: 3/3/2023  [facility] appointments: 3/20/2024  Medication last refilled: 3/3/2023; 90 + 3 refills  Last qualifying labs:     BP Readings from Last 3 Encounters:   03/03/23 119/76   08/01/22 (!) 122/90   05/24/21 129/82     Component      Latest Ref Rng 8/1/2022  2:11 PM   Creatinine      0.51 - 0.95 mg/dL 0.69      Medication is being filled for 1 time refill only due to:   pt has upcoming appt on 3/20/24.    JUAN A Herr, RN  02/28/24, 2:34 PM

## 2024-04-12 ENCOUNTER — MYC MEDICAL ADVICE (OUTPATIENT)
Dept: SURGERY | Facility: CLINIC | Age: 49
End: 2024-04-12
Payer: MEDICARE

## 2024-04-24 ENCOUNTER — HOSPITAL ENCOUNTER (OUTPATIENT)
Facility: AMBULATORY SURGERY CENTER | Age: 49
End: 2024-04-24
Attending: PLASTIC SURGERY | Admitting: PLASTIC SURGERY
Payer: MEDICARE

## 2024-04-24 NOTE — TELEPHONE ENCOUNTER
RN Care Coordinator: Karma Hall    Surgery is scheduled with Dr. Ivory  Date: 7/12   Location: [facility]    H&P to be completed by: Primary Care team - patient instructed to schedule   per patient, this will be scheduled with Madyson Nunez MD        Surgical consult: 6/7 with Dr. Ivory, [place] [facility]    Post-op: 7/23 with Linda Fox PA-C, [place] [facility]    Patient will receive a phone call from pre-admission nurses 3-5 days prior to surgery with arrival time and NPO instructions.     Spoke with the patient and was able to confirm all scheduled information.       Patient questions/concerns: N/A       Surgery packet: was sent via [employer]    __    Scayl on 4/24/2024 at 11:06 AM  P: [phone]   Self

## 2024-06-07 ENCOUNTER — TELEPHONE (OUTPATIENT)
Dept: SURGERY | Facility: CLINIC | Age: 49
End: 2024-06-07

## 2024-06-07 NOTE — TELEPHONE ENCOUNTER
Patient arrived to her appointment at the [facility] patient then made it to [facility] where she checked in but then left the clinic. This writer call patient to make sure patient was ok and if she wanted to come into the clinic to see Dr. Ivory. Patient stated that she didn't get a great night of sleep due to a new pet. Patient expressed she recently started a new job and this is causing her a lot of stress. Patient reported that she is very nervous about the scars.Patient requests that an RN to call and discuss the scars with her.  Patient expressed she is not in a good place mentally, patient reported she does not have any suicidal ideation however patient states she does have clinical PTSD did not specify about what. Patient agreed she will bring a family member to her next appointment. She stated that this makes her feel more comfortable overall if she has a family member with her. Patient shared that she is a recovering acholic and that she needs a support system. Staff encouraged patient to bring her mom to her next appointment.  Patient was asked if she would like a mental health referral placed. Patient agreed to this plan.  ( Send to Dr. Ivory to place referral)       Patient was concerned about coverage of surgery since she will have new insurance. Patient would like a cost estimate of the surgery.  Patient would like to know what is covered by her insurance. Patient is transitioning insurance companies currently. Patient stated we may need to move her surgery date due to all the above concerned so that she can become more comfortable in her new job.     Patient also would like to know if there is a payment plan.     Winsome Berger LPN on 6/7/2024 at 3:30 PM

## 2024-06-07 NOTE — TELEPHONE ENCOUNTER
"Attempted to reach out to pt to discuss 's message below and assist with any questions/concerns pt has about surgery. No answer at mobile or home number and recording states \"pt has a voice mail system that has not been set up yet\" and phone disconnects. Tried again with same results . Will flag encounter to try to reach pt again and will also send [employer] message.Karma Oliver    She needs to see us in clinic before I will do any surgery on her.  She is scheduled for July 12, 2024.  She did not show up in clinic today.  If she does not see me in clinic we need to reschedule her case.    She needs to discuss her mental health issues with her primary care physician and appropriately get scheduled through them.    Thank you    Umar   "

## 2024-06-10 DIAGNOSIS — I10 BENIGN ESSENTIAL HYPERTENSION: ICD-10-CM

## 2024-06-10 RX ORDER — AMLODIPINE BESYLATE 10 MG/1
10 TABLET ORAL DAILY
Qty: 90 TABLET | Refills: 0 | Status: SHIPPED | OUTPATIENT
Start: 2024-06-10 | End: 2024-06-30

## 2024-06-10 NOTE — TELEPHONE ENCOUNTER
Medication requested: amLODIPine (NORVASC) 10 MG tablet   Last office visit: 3/3/23  [facility] appointments: 7/1/2024  Medication last refilled: 2/28/2024; 90 + 0 refills  Last qualifying labs:     BP Readings from Last 3 Encounters:   03/03/23 119/76   08/01/22 (!) 122/90   05/24/21 129/82     Component      Latest Ref Rng 8/1/2022  2:11 PM   Potassium      3.4 - 5.3 mmol/L 4.1      Medication is being filled for 1 time refill only due to:   pt has upcoming appt.    JUAN A Herr, RN  06/10/24, 2:30 PM

## 2024-06-11 ENCOUNTER — TRANSCRIBE ORDERS (OUTPATIENT)
Dept: SURGERY | Facility: CLINIC | Age: 49
End: 2024-06-11
Payer: MEDICARE

## 2024-06-17 DIAGNOSIS — A60.00 GENITAL HERPES SIMPLEX, UNSPECIFIED SITE: ICD-10-CM

## 2024-06-17 RX ORDER — VALACYCLOVIR HYDROCHLORIDE 500 MG/1
TABLET, FILM COATED ORAL
Qty: 90 TABLET | Refills: 0 | Status: SHIPPED | OUTPATIENT
Start: 2024-06-17

## 2024-06-17 NOTE — TELEPHONE ENCOUNTER
Medication requested: valACYclovir (VALTREX) 500 MG tablet   Last office visit: 3/3/2023  [facility] appointments: 7/1/2024  Medication last refilled: 3/3/2023; 90 + 3 refills  Last qualifying labs: n/a    Medication is being filled for 1 time refill only due to:  Patient needs to be seen because it has been more than one year since last visit. Pt has upcoming appt.    JUAN A Herr, RN  06/17/24, 4:16 PM

## 2024-06-27 NOTE — PROGRESS NOTES
Susannah Goddard is a 48 yo woman with hx of depression with anxiety, genital herpes, alcohol dependence in remission, and Raynaud's disease without gangrene. She is here for a preventive exam  She is not fasting. She is due for eye exam and up to date on dental visits.    HCM  Advanced directive: Not on file, copy given to patient  COVID vaccine Eligible for additional dose DECLINES  Other vaccines  Due for TDAP-due today, will go to pharmacy (Medicare ins)  Colon cancer screening due now, using metamucil, prefers COLOGUARD  Pap last done 2018,  due now ---on menstrual cycle, day 2 of cycle, will return for pap  Mammogram due now, planning to have breast reduction surgery in July      Diet: omnivore  Not consistent with cooking  Wt Readings from Last 4 Encounters:   07/01/24 86.2 kg (190 lb)   05/12/23 81 kg (178 lb 9.6 oz)   03/03/23 80.7 kg (178 lb)   08/01/22 79.9 kg (176 lb 0.6 oz)     Body mass index is 31.62 kg/m .      Concerns about weight/ sugar craving  Weight increase of 12 lb since last year  Craves sugar at night, craving protein bars  Does not prepare meals ahead, becomes hungry and eats snacks    Perimenopause  Started on OCP 8/2022 to treat hot flashes  No longer using it, no current partner, hot flashes are tolerable    Benign essential hypertension  Amlodipine 10mg daily (also used for Raynauds)  No palpitations, chest pain  BP Readings from Last 3 Encounters:   07/01/24 108/68   03/03/23 119/76   08/01/22 (!) 122/90     Breast Reduction Planned  hx of persistent neck and upper back strain, stiffness.  Referred to plastics for discussion  Planning to undergo surgery 7/2024   Does not do self breast exam  Needing mammogram prior to surgery    Genital HSV  Previously used valacyclovir 500mg daily to prevent outbreaks  Now using only for outbreaks which are rare.   Can use 500mg twice daily x 3 days if needed for outbreaks      PMH, PSH, FH, medications, allergies and immunizations are updated this  "visit.      SOCIAL:  Single, lives alone  Working as a drug and alcohol counselor [employer]  Goes to AA meetings twice weekly, has [facility] worker    HABITS:  Tob: none  ETOH: sober since 2/9/2011 -- 14 yr of sobriety  Calcium: 2 per day, no D supplement  Caffeine: \"too much\"  energy drinks (3x), coffee  Exercise: 3x per week, weights, elliptical, walking     OB/GYN HISTORY:  LMP Patient's last menstrual period was 06/30/2024 (exact date).  Hx abnormal pap?  none  STD hx?  HSV 2, rare outbreaks  cycle length:  Monthly cycle, mentions that they are \"turning irregular\".   Flow x 5 days, heavy, some irregularity  dysmenorrhea/PMS:  minimal, endorses migraines  Vasomotor sx: mild  Contraception: no partner  G 1   P 1   A 0  Self Breast exam:  none     Current Outpatient Medications   Medication Sig Dispense Refill    amLODIPine (NORVASC) 10 MG tablet Take 1 tablet (10 mg) by mouth daily 90 tablet 1    cloNIDine (CATAPRES) 0.2 MG tablet Take 0.2 mg by mouth 3 times daily      DULoxetine (CYMBALTA) 60 MG capsule Take 2 capsules (120 mg) by mouth daily Managed by psychiatrist      escitalopram (LEXAPRO) 20 MG tablet Take 1 tablet (20 mg) by mouth daily Managed by psychiatrist      LYSINE HCL PO Take 500 mg % by mouth daily Taken for cold sores       omega 3 1000 MG CAPS Take 1000mg daily 90 capsule 3    QUEtiapine (SEROQUEL) 50 MG tablet 1 tablet (50 mg) 2 times daily Take 50-100mg po BID and take 100mg po q hs, prescribed by psychiatry      valACYclovir (VALTREX) 500 MG tablet Take 1 po q day to prevent HSV outbreak 90 tablet 0     Allergies   Allergen Reactions    Dilaudid [Hydromorphone]          ROS  CONSTITUTIONAL:NEGATIVE for fever, chills, + weight gain 12 lb in past year  INTEGUMENTARY/SKIN: NEGATIVE for worrisome rashes, moles or lesions  EYES: NEGATIVE for vision changes or irritation  ENT/MOUTH: NEGATIVE for ear, mouth and throat problems  RESP:NEGATIVE for significant cough or SOB  CV: NEGATIVE for chest " "pain, palpitations, PEREZ, orthopnea, PND  or peripheral edema  GI: NEGATIVE for nausea, abdominal pain, heartburn, or change in bowel habits, using metamucil daily  :NEGATIVE for frequency, dysuria, or hematuria  MUSCULOSKELETAL: neck and back stiffness/pain, chronic  NEURO: NEGATIVE for weakness, dizziness or paresthesias  ENDOCRINE: NEGATIVE for polyuria/dipsia,  temperature intolerance, skin/hair changes  HEME/ALLERGY/IMMUNE: NEGATIVE for bleeding problems  PSYCHIATRIC: +hx of depression, anxiety, alcoholism, in remission. Some situational stress with starting a new job    EXAM  /68 (BP Location: Right arm, Patient Position: Sitting, Cuff Size: Adult Regular)   Pulse 64   Temp 97.7  F (36.5  C) (Skin)   Resp 14   Ht 1.651 m (5' 5\")   Wt 86.2 kg (190 lb)   LMP 06/30/2024 (Exact Date)   SpO2 97%   BMI 31.62 kg/m    GENERAL APPEARANCE: Alert, pleasant, NAD  EYES: PERRL, EOMI, conjunctiva clear  HENT: TM normal bilaterally. Nose and mouth without lesions  NECK: no adenopathy, thyroid normal to palpation  RESP: lungs clear to auscultation bilaterally  BREAST: normal without masses, no tenderness or nipple discharge and no palpable  axillary masses or adenopathy  CV: regular rate and rhythm, normal S1 S2, no murmur, no carotid bruits  ABDOMEN: soft, nontender, without HSM or masses. Bowel sounds normal  MS: extremities normal- no gross deformities noted, no tender, hot or swollen joints.   SKIN: darker, slightly irregular macule at mid back, left side.    NEURO: Normal strength and tone, sensory exam grossly normal, DTR normoreflexive in upper and lower extremities  PSYCH: mentation appears normal. and affect normal/bright.  EXT: no peripheral edema, pedal pulses palpable    Assessment:  (Z00.00) Encounter for Medicare annual wellness exam  (primary encounter diagnosis)  Comment: 49 year old woman in good health  Plan: Today we discussed ways to maintain a healthy lifestyle with sensible eating, regular " exercise and self cares. We dicussed calcium and Vitamin D intake, vaccinations and preventive health screens.  Recommend Tetanus booster at pharmacy prior to upcoming surgery, she declines COVID vaccine, menstruating today, will return for pap smear.      (Z12.11) Screen for colon cancer  Comment: low risk patient, prefers Cologuard screening method  Plan: COLOGUARD([employer])        Referral is entered    (Z12.31) Visit for screening mammogram  Comment: normal clinical exam, upcoming breast reduction surgery  Plan: MA Screen Bilateral w/Reid        Referral is entered    (Z13.220) Lipid screening  Comment: not fasting  Plan: Lipid Profile            (I10) Benign essential hypertension  Comment: BP in target range  Plan: Comprehensive metabolic panel, amLODIPine         (NORVASC) 10 MG tablet        Refilled medication for one year    (A60.00) Genital herpes simplex, unspecified site  Comment: rare outbreaks  Plan: she will call when valacyclovir Rx is needed    (R63.5) Weight gain  Comment: 12 pound weight gain in past year  Plan: Hemoglobin A1c        Discussed making small but consistent changes, starting with menu planning for at least 1-2 days of the week.     (N92.0) Menorrhagia with regular cycle  Comment: heavy menses  Plan: CBC with platelets            (L98.9) Skin lesion  Comment: darker slightly irreg macule at left upper back  Plan: Adult Dermatology  Referral        Refer to dermatology, [facility]    Madyson Nunez MD  Internal Medicine/Pediatrics      Answers submitted by the patient for this visit:  Patient Health Questionnaire (Submitted on 7/1/2024)  If you checked off any problems, how difficult have these problems made it for you to do your work, take care of things at home, or get along with other people?: Somewhat difficult  PHQ9 TOTAL SCORE: 10

## 2024-06-30 RX ORDER — DULOXETIN HYDROCHLORIDE 60 MG/1
120 CAPSULE, DELAYED RELEASE ORAL DAILY
COMMUNITY
Start: 2024-06-30 | End: 2024-07-01

## 2024-06-30 NOTE — PATIENT INSTRUCTIONS
"Vaccinations  Go to pharmacy for Tetanus booster before surgery.    Patient Education   Preventive Care Advice   This is general advice we often give to help people stay healthy. Your care team may have specific advice just for you. Please talk to your care team about your own preventive care needs.  Lifestyle  Exercise at least 150 minutes each week (30 minutes a day, 5 days a week).  Do muscle strengthening activities 2 days a week. These help control your weight and prevent disease.  No smoking.  Wear sunscreen to prevent skin cancer.  Have your home tested for radon every 2 to 5 years. Radon is a colorless, odorless gas that can harm your lungs. To learn more, go to www.health.[facility].[place].us and search for \"Radon in Homes.\"  Keep guns unloaded and locked up in a safe place like a safe or gun vault, or, use a gun lock and hide the keys. Always lock away bullets separately. To learn more, visit [employer].[place].gov and search for \"safe gun storage.\"  Nutrition  Eat 5 or more servings of fruits and vegetables each day.  Try wheat bread, brown rice and whole grain pasta (instead of white bread, rice, and pasta).  Get enough calcium and vitamin D. Check the label on foods and aim for 100% of the RDA (recommended daily allowance).  Regular exams  Have a dental exam and cleaning every 6 months.  See your health care team every year to talk about:  Any changes in your health.  Any medicines your care team has prescribed.  Preventive care, family planning, and ways to prevent chronic diseases.  Shots (vaccines)   HPV shots (up to age 26), if you've never had them before.  Hepatitis B shots (up to age 59), if you've never had them before.  COVID-19 shot: Get this shot when it's due.  Flu shot: Get a flu shot every year.  Tetanus shot: Get a tetanus shot every 10 years.  Pneumococcal, hepatitis A, and RSV shots: Ask your care team if you need these based on your risk.  Shingles shot (for age 50 and up).  General health tests  Diabetes " screening:  Starting at age 35, Get screened for diabetes at least every 3 years.  If you are younger than age 35, ask your care team if you should be screened for diabetes.  Cholesterol test: At age 39, start having a cholesterol test every 5 years, or more often if advised.  Bone density scan (DEXA): At age 50, ask your care team if you should have this scan for osteoporosis (brittle bones).  Hepatitis C: Get tested at least once in your life.  Abdominal aortic aneurysm screening: Talk to your doctor about having this screening if you:  Have ever smoked; and  Are biologically male; and  Are between the ages of 65 and 75.  STIs (sexually transmitted infections)  Before age 24: Ask your care team if you should be screened for STIs.  After age 24: Get screened for STIs if you're at risk. You are at risk for STIs (including HIV) if:  You are sexually active with more than one person.  You don't use condoms every time.  You or a partner was diagnosed with a sexually transmitted infection.  If you are at risk for HIV, ask about PrEP medicine to prevent HIV.  Get tested for HIV at least once in your life, whether you are at risk for HIV or not.  Cancer screening tests  Cervical cancer screening: If you have a cervix, begin getting regular cervical cancer screening tests at age 21. Most people who have regular screenings with normal results can stop after age 65. Talk about this with your provider.  Breast cancer scan (mammogram): If you've ever had breasts, begin having regular mammograms starting at age 40. This is a scan to check for breast cancer.  Colon cancer screening: It is important to start screening for colon cancer at age 45.  Have a colonoscopy test every 10 years (or more often if you're at risk) Or, ask your provider about stool tests like a FIT test every year or Cologuard test every 3 years.  To learn more about your testing options, visit: www.[employer]/543133.pdf.  For help making a decision, visit:  jose m.ly/ej71461.  Prostate cancer screening test: If you have a prostate and are age 55 to 69, ask your provider if you would benefit from a yearly prostate cancer screening test.  Lung cancer screening: If you are a current or former smoker age 50 to 80, ask your care team if ongoing lung cancer screenings are right for you.  For informational purposes only. Not to replace the advice of your health care provider. Copyright   2023 [facility]. All rights reserved. Clinically reviewed by the [facility]itions Program. [employer] [id number] - REV 04/24.

## 2024-07-01 ENCOUNTER — OFFICE VISIT (OUTPATIENT)
Dept: FAMILY MEDICINE | Facility: CLINIC | Age: 49
End: 2024-07-01
Payer: MEDICARE

## 2024-07-01 VITALS
RESPIRATION RATE: 14 BRPM | HEART RATE: 64 BPM | SYSTOLIC BLOOD PRESSURE: 108 MMHG | BODY MASS INDEX: 31.65 KG/M2 | TEMPERATURE: 97.7 F | WEIGHT: 190 LBS | OXYGEN SATURATION: 97 % | DIASTOLIC BLOOD PRESSURE: 68 MMHG | HEIGHT: 65 IN

## 2024-07-01 DIAGNOSIS — I10 BENIGN ESSENTIAL HYPERTENSION: ICD-10-CM

## 2024-07-01 DIAGNOSIS — A60.00 GENITAL HERPES SIMPLEX, UNSPECIFIED SITE: ICD-10-CM

## 2024-07-01 DIAGNOSIS — L98.9 SKIN LESION: ICD-10-CM

## 2024-07-01 DIAGNOSIS — Z00.00 ENCOUNTER FOR MEDICARE ANNUAL WELLNESS EXAM: Primary | ICD-10-CM

## 2024-07-01 DIAGNOSIS — Z13.220 LIPID SCREENING: ICD-10-CM

## 2024-07-01 DIAGNOSIS — N92.0 MENORRHAGIA WITH REGULAR CYCLE: ICD-10-CM

## 2024-07-01 DIAGNOSIS — Z12.11 SCREEN FOR COLON CANCER: ICD-10-CM

## 2024-07-01 DIAGNOSIS — R63.5 WEIGHT GAIN: ICD-10-CM

## 2024-07-01 DIAGNOSIS — Z12.31 VISIT FOR SCREENING MAMMOGRAM: ICD-10-CM

## 2024-07-01 LAB
ERYTHROCYTE [DISTWIDTH] IN BLOOD BY AUTOMATED COUNT: 13.6 % (ref 10–15)
HBA1C MFR BLD: 5.1 % (ref 0–5.6)
HCT VFR BLD AUTO: 40.1 % (ref 35–47)
HGB BLD-MCNC: 13.5 G/DL (ref 11.7–15.7)
MCH RBC QN AUTO: 30.3 PG (ref 26.5–33)
MCHC RBC AUTO-ENTMCNC: 33.7 G/DL (ref 31.5–36.5)
MCV RBC AUTO: 90 FL (ref 78–100)
PLATELET # BLD AUTO: 269 10E3/UL (ref 150–450)
RBC # BLD AUTO: 4.45 10E6/UL (ref 3.8–5.2)
WBC # BLD AUTO: 8.4 10E3/UL (ref 4–11)

## 2024-07-01 PROCEDURE — 84478 ASSAY OF TRIGLYCERIDES: CPT | Mod: ORL | Performed by: INTERNAL MEDICINE

## 2024-07-01 PROCEDURE — 80053 COMPREHEN METABOLIC PANEL: CPT | Mod: ORL | Performed by: INTERNAL MEDICINE

## 2024-07-01 PROCEDURE — 82465 ASSAY BLD/SERUM CHOLESTEROL: CPT | Mod: ORL | Performed by: INTERNAL MEDICINE

## 2024-07-01 RX ORDER — CLONIDINE HYDROCHLORIDE 0.2 MG/1
0.2 TABLET ORAL 3 TIMES DAILY
COMMUNITY

## 2024-07-01 RX ORDER — QUETIAPINE FUMARATE 50 MG/1
50 TABLET, FILM COATED ORAL 2 TIMES DAILY
COMMUNITY
Start: 2024-07-01

## 2024-07-01 RX ORDER — ESCITALOPRAM OXALATE 20 MG/1
20 TABLET ORAL DAILY
COMMUNITY
Start: 2024-06-30

## 2024-07-01 RX ORDER — AMLODIPINE BESYLATE 10 MG/1
10 TABLET ORAL DAILY
Qty: 90 TABLET | Refills: 1 | Status: SHIPPED | OUTPATIENT
Start: 2024-07-01

## 2024-07-01 RX ORDER — VALACYCLOVIR HYDROCHLORIDE 500 MG/1
TABLET, FILM COATED ORAL
Qty: 90 TABLET | Refills: 3 | Status: CANCELLED | OUTPATIENT
Start: 2024-07-01

## 2024-07-01 RX ORDER — DULOXETIN HYDROCHLORIDE 60 MG/1
120 CAPSULE, DELAYED RELEASE ORAL DAILY
COMMUNITY
Start: 2024-07-01

## 2024-07-01 SDOH — HEALTH STABILITY: PHYSICAL HEALTH: ON AVERAGE, HOW MANY DAYS PER WEEK DO YOU ENGAGE IN MODERATE TO STRENUOUS EXERCISE (LIKE A BRISK WALK)?: 4 DAYS

## 2024-07-01 ASSESSMENT — PATIENT HEALTH QUESTIONNAIRE - PHQ9
SUM OF ALL RESPONSES TO PHQ QUESTIONS 1-9: 10
SUM OF ALL RESPONSES TO PHQ QUESTIONS 1-9: 10
10. IF YOU CHECKED OFF ANY PROBLEMS, HOW DIFFICULT HAVE THESE PROBLEMS MADE IT FOR YOU TO DO YOUR WORK, TAKE CARE OF THINGS AT HOME, OR GET ALONG WITH OTHER PEOPLE: SOMEWHAT DIFFICULT

## 2024-07-01 ASSESSMENT — SOCIAL DETERMINANTS OF HEALTH (SDOH): HOW OFTEN DO YOU GET TOGETHER WITH FRIENDS OR RELATIVES?: TWICE A WEEK

## 2024-07-01 NOTE — NURSING NOTE
"49 year old  Chief Complaint   Patient presents with    Physical     Weight gain and sugar cravings at night. Changes in sleep schedule.        Blood pressure 108/68, pulse 64, temperature 97.7  F (36.5  C), temperature source Skin, resp. rate 14, height 1.651 m (5' 5\"), weight 86.2 kg (190 lb), last menstrual period 06/30/2024, SpO2 97%, not currently breastfeeding. Body mass index is 31.62 kg/m .  Patient Active Problem List   Diagnosis    Anxiety and depression    Herpes, genital    Alcohol dependence in remission (H)    Raynaud's disease without gangrene    Neck and shoulder pain    Benign essential hypertension    Hypertrophy of breast       Wt Readings from Last 2 Encounters:   07/01/24 86.2 kg (190 lb)   05/12/23 81 kg (178 lb 9.6 oz)     BP Readings from Last 3 Encounters:   07/01/24 108/68   03/03/23 119/76   08/01/22 (!) 122/90         Current Outpatient Medications   Medication Sig Dispense Refill    amLODIPine (NORVASC) 10 MG tablet Take 1 tablet (10 mg) by mouth daily 90 tablet 0    cloNIDine (CATAPRES) 0.2 MG tablet Take 0.2 mg by mouth 3 times daily      DULoxetine (CYMBALTA) 60 MG capsule Take 120 mg by mouth daily Managed by psychiatrist      LYSINE HCL PO Take 500 mg % by mouth daily Taken for cold sores       omega 3 1000 MG CAPS Take 1000mg daily 90 capsule 3    QUEtiapine (SEROQUEL) 50 MG tablet 50 mg 2 times daily Take 50-100mg po TID and take 300mg po q hs, prescribed by psychiatry 270 tablet 0    valACYclovir (VALTREX) 500 MG tablet Take 1 po q day to prevent HSV outbreak 90 tablet 0    escitalopram (LEXAPRO) 20 MG tablet Take 20 mg by mouth daily       No current facility-administered medications for this visit.       Social History     Tobacco Use    Smoking status: Former     Types: Cigarettes    Smokeless tobacco: Never    Tobacco comments:     Previous social smoker   Vaping Use    Vaping status: Never Used   Substance Use Topics    Alcohol use: No     Comment: sober since 2/9/11    Drug " use: No       Health Maintenance Due   Topic Date Due    ADVANCE CARE PLANNING  Never done    COLORECTAL CANCER SCREENING  Never done    DTAP/TDAP/TD IMMUNIZATION (3 - Td or Tdap) 01/28/2020    HPV TEST  01/24/2023    PAP  01/24/2023    ZOSTER IMMUNIZATION (1 of 2) Never done    LUNG CANCER SCREENING  Never done    COVID-19 Vaccine (2 - 2023-24 season) 09/01/2023    MAMMO SCREENING  05/11/2024    PHQ-9  08/01/2024       Lab Results   Component Value Date    PAP NIL 01/24/2018 July 1, 2024 1:44 PM

## 2024-07-02 LAB
ALBUMIN SERPL BCG-MCNC: 3.9 G/DL (ref 3.5–5.2)
ALP SERPL-CCNC: 44 U/L (ref 40–150)
ALT SERPL W P-5'-P-CCNC: 21 U/L (ref 0–50)
ANION GAP SERPL CALCULATED.3IONS-SCNC: 7 MMOL/L (ref 7–15)
AST SERPL W P-5'-P-CCNC: 19 U/L (ref 0–45)
BILIRUB SERPL-MCNC: 0.2 MG/DL
BUN SERPL-MCNC: 12.5 MG/DL (ref 6–20)
CALCIUM SERPL-MCNC: 8.7 MG/DL (ref 8.6–10)
CHLORIDE SERPL-SCNC: 103 MMOL/L (ref 98–107)
CHOLEST SERPL-MCNC: 147 MG/DL
CREAT SERPL-MCNC: 0.79 MG/DL (ref 0.51–0.95)
DEPRECATED HCO3 PLAS-SCNC: 27 MMOL/L (ref 22–29)
EGFRCR SERPLBLD CKD-EPI 2021: 90 ML/MIN/1.73M2
FASTING STATUS PATIENT QL REPORTED: NO
FASTING STATUS PATIENT QL REPORTED: NO
GLUCOSE SERPL-MCNC: 83 MG/DL (ref 70–99)
HDLC SERPL-MCNC: 49 MG/DL
LDLC SERPL CALC-MCNC: 73 MG/DL
NONHDLC SERPL-MCNC: 98 MG/DL
POTASSIUM SERPL-SCNC: 3.6 MMOL/L (ref 3.4–5.3)
PROT SERPL-MCNC: 6.1 G/DL (ref 6.4–8.3)
SODIUM SERPL-SCNC: 137 MMOL/L (ref 135–145)
TRIGL SERPL-MCNC: 126 MG/DL

## 2024-07-06 ENCOUNTER — ANCILLARY PROCEDURE (OUTPATIENT)
Dept: ULTRASOUND IMAGING | Facility: CLINIC | Age: 49
End: 2024-07-06
Attending: STUDENT IN AN ORGANIZED HEALTH CARE EDUCATION/TRAINING PROGRAM
Payer: MEDICARE

## 2024-07-06 RX ORDER — NALOXONE HYDROCHLORIDE 0.4 MG/ML
0.4 INJECTION, SOLUTION INTRAMUSCULAR; INTRAVENOUS; SUBCUTANEOUS
Status: CANCELLED | OUTPATIENT
Start: 2024-07-06

## 2024-07-06 RX ORDER — FLUMAZENIL 0.1 MG/ML
0.2 INJECTION, SOLUTION INTRAVENOUS
Status: CANCELLED | OUTPATIENT
Start: 2024-07-06

## 2024-07-06 RX ORDER — FENTANYL CITRATE 50 UG/ML
25-50 INJECTION, SOLUTION INTRAMUSCULAR; INTRAVENOUS
Status: CANCELLED | OUTPATIENT
Start: 2024-07-06

## 2024-07-06 RX ORDER — NALOXONE HYDROCHLORIDE 0.4 MG/ML
0.2 INJECTION, SOLUTION INTRAMUSCULAR; INTRAVENOUS; SUBCUTANEOUS
Status: CANCELLED | OUTPATIENT
Start: 2024-07-06

## 2024-07-08 ENCOUNTER — ORDERS ONLY (AUTO-RELEASED) (OUTPATIENT)
Dept: FAMILY MEDICINE | Facility: CLINIC | Age: 49
End: 2024-07-08

## 2024-07-08 DIAGNOSIS — Z12.11 SCREEN FOR COLON CANCER: ICD-10-CM

## 2024-07-09 ENCOUNTER — TELEPHONE (OUTPATIENT)
Dept: SURGERY | Facility: CLINIC | Age: 49
End: 2024-07-09
Payer: MEDICARE

## 2024-07-09 ENCOUNTER — MYC MEDICAL ADVICE (OUTPATIENT)
Dept: SURGERY | Facility: CLINIC | Age: 49
End: 2024-07-09
Payer: MEDICARE

## 2024-07-09 NOTE — TELEPHONE ENCOUNTER
Received voicemail from patient on 7/8    Patient states she needs to finally cancel breast reduction surgery on 7/12 due to a family emergency, dental work needs, and just starting a new job    She states if she decides to proceed, it would likely be a year out, so she requested we cancel her     Cancelled surgery and post-op    Attempted to call patient to verify cancellation, no voicemail option  __    Winnie Rodriguez on 7/9/2024 at 10:56 AM  [phone]

## 2024-07-09 NOTE — TELEPHONE ENCOUNTER
Writer attempted to reach patient regarding breast reduction surgery on 7/12/24. Phone rang multiple times and then said the call could not be completed at this time.    Patient has missed 2 scheduled pre op's. Inquiring if patient is still planning on surgery.    Felicia Lopez LPN

## 2024-08-31 ENCOUNTER — HEALTH MAINTENANCE LETTER (OUTPATIENT)
Age: 49
End: 2024-08-31

## 2025-02-04 ENCOUNTER — OFFICE VISIT (OUTPATIENT)
Dept: FAMILY MEDICINE | Facility: CLINIC | Age: 50
End: 2025-02-04
Payer: COMMERCIAL

## 2025-02-04 VITALS
WEIGHT: 188 LBS | RESPIRATION RATE: 16 BRPM | HEART RATE: 67 BPM | BODY MASS INDEX: 31.32 KG/M2 | TEMPERATURE: 96.7 F | DIASTOLIC BLOOD PRESSURE: 89 MMHG | OXYGEN SATURATION: 98 % | SYSTOLIC BLOOD PRESSURE: 128 MMHG | HEIGHT: 65 IN

## 2025-02-04 DIAGNOSIS — F32.A ANXIETY AND DEPRESSION: ICD-10-CM

## 2025-02-04 DIAGNOSIS — F41.9 ANXIETY AND DEPRESSION: ICD-10-CM

## 2025-02-04 DIAGNOSIS — A60.00 GENITAL HERPES SIMPLEX, UNSPECIFIED SITE: ICD-10-CM

## 2025-02-04 DIAGNOSIS — Z23 NEED FOR TDAP VACCINATION: ICD-10-CM

## 2025-02-04 DIAGNOSIS — R07.9 CHEST PAIN, UNSPECIFIED TYPE: ICD-10-CM

## 2025-02-04 DIAGNOSIS — R63.5 WEIGHT GAIN: ICD-10-CM

## 2025-02-04 DIAGNOSIS — I10 BENIGN ESSENTIAL HYPERTENSION: ICD-10-CM

## 2025-02-04 DIAGNOSIS — Z09 HOSPITAL DISCHARGE FOLLOW-UP: Primary | ICD-10-CM

## 2025-02-04 RX ORDER — AMLODIPINE BESYLATE 10 MG/1
10 TABLET ORAL DAILY
Qty: 90 TABLET | Refills: 3 | Status: SHIPPED | OUTPATIENT
Start: 2025-02-04

## 2025-02-04 RX ORDER — QUETIAPINE FUMARATE 100 MG/1
100 TABLET, FILM COATED ORAL AT BEDTIME
COMMUNITY

## 2025-02-04 RX ORDER — VALACYCLOVIR HYDROCHLORIDE 500 MG/1
TABLET, FILM COATED ORAL
Qty: 90 TABLET | Refills: 3 | Status: SHIPPED | OUTPATIENT
Start: 2025-02-04

## 2025-02-04 RX ORDER — QUETIAPINE FUMARATE 25 MG/1
25 TABLET, FILM COATED ORAL DAILY
COMMUNITY
Start: 2025-01-12

## 2025-02-04 ASSESSMENT — ANXIETY QUESTIONNAIRES
6. BECOMING EASILY ANNOYED OR IRRITABLE: NEARLY EVERY DAY
5. BEING SO RESTLESS THAT IT IS HARD TO SIT STILL: NEARLY EVERY DAY
GAD7 TOTAL SCORE: 21
7. FEELING AFRAID AS IF SOMETHING AWFUL MIGHT HAPPEN: NEARLY EVERY DAY
IF YOU CHECKED OFF ANY PROBLEMS ON THIS QUESTIONNAIRE, HOW DIFFICULT HAVE THESE PROBLEMS MADE IT FOR YOU TO DO YOUR WORK, TAKE CARE OF THINGS AT HOME, OR GET ALONG WITH OTHER PEOPLE: VERY DIFFICULT
3. WORRYING TOO MUCH ABOUT DIFFERENT THINGS: NEARLY EVERY DAY
GAD7 TOTAL SCORE: 21
GAD7 TOTAL SCORE: 21
7. FEELING AFRAID AS IF SOMETHING AWFUL MIGHT HAPPEN: NEARLY EVERY DAY
2. NOT BEING ABLE TO STOP OR CONTROL WORRYING: NEARLY EVERY DAY
8. IF YOU CHECKED OFF ANY PROBLEMS, HOW DIFFICULT HAVE THESE MADE IT FOR YOU TO DO YOUR WORK, TAKE CARE OF THINGS AT HOME, OR GET ALONG WITH OTHER PEOPLE?: VERY DIFFICULT
4. TROUBLE RELAXING: NEARLY EVERY DAY
1. FEELING NERVOUS, ANXIOUS, OR ON EDGE: NEARLY EVERY DAY

## 2025-02-04 ASSESSMENT — PAIN SCALES - GENERAL: PAINLEVEL_OUTOF10: NO PAIN (0)

## 2025-02-04 ASSESSMENT — PATIENT HEALTH QUESTIONNAIRE - PHQ9
SUM OF ALL RESPONSES TO PHQ QUESTIONS 1-9: 10
10. IF YOU CHECKED OFF ANY PROBLEMS, HOW DIFFICULT HAVE THESE PROBLEMS MADE IT FOR YOU TO DO YOUR WORK, TAKE CARE OF THINGS AT HOME, OR GET ALONG WITH OTHER PEOPLE: VERY DIFFICULT
SUM OF ALL RESPONSES TO PHQ QUESTIONS 1-9: 10

## 2025-02-04 NOTE — NURSING NOTE
"49 year old    Chief Complaint   Patient presents with    Weight Loss    Hypertension        Blood pressure 128/89, pulse 67, temperature (!) 96.7  F (35.9  C), temperature source Skin, resp. rate 16, height 1.65 m (5' 4.96\"), weight 85.3 kg (188 lb), SpO2 98%, not currently breastfeeding. Body mass index is 31.32 kg/m .    Patient Active Problem List   Diagnosis    Anxiety and depression    Herpes, genital    Alcohol dependence in remission (H)    Raynaud's disease without gangrene    Neck and shoulder pain    Benign essential hypertension    Hypertrophy of breast          Wt Readings from Last 2 Encounters:   02/04/25 85.3 kg (188 lb)   07/01/24 86.2 kg (190 lb)       BP Readings from Last 3 Encounters:   02/04/25 128/89   07/01/24 108/68   03/03/23 119/76             Current Outpatient Medications   Medication Sig Dispense Refill    amLODIPine (NORVASC) 10 MG tablet Take 1 tablet (10 mg) by mouth daily 90 tablet 1    cloNIDine (CATAPRES) 0.2 MG tablet Take 0.2 mg by mouth 3 times daily      DULoxetine (CYMBALTA) 60 MG capsule Take 2 capsules (120 mg) by mouth daily Managed by psychiatrist      escitalopram (LEXAPRO) 20 MG tablet Take 1 tablet (20 mg) by mouth daily Managed by psychiatrist      Lysine 500 MG CAPS Take 1 capsule by mouth daily. 90 capsule 3    omega 3 1000 MG CAPS Take 1000mg daily 90 capsule 3    QUEtiapine (SEROQUEL) 100 MG tablet Take 100 mg by mouth at bedtime.      QUEtiapine (SEROQUEL) 25 MG tablet Take 25 mg by mouth daily.      valACYclovir (VALTREX) 500 MG tablet Take 1 po q day to prevent HSV outbreak 90 tablet 1    QUEtiapine (SEROQUEL) 50 MG tablet Take 25 mg by mouth daily. Take 50-100mg po BID and take 100mg po q hs, prescribed by psychiatry (Patient not taking: Reported on 2/4/2025)       No current facility-administered medications for this visit.          Social History     Tobacco Use    Smoking status: Former     Types: Cigarettes    Smokeless tobacco: Never    Tobacco comments: "     Previous social smoker   Vaping Use    Vaping status: Never Used   Substance Use Topics    Alcohol use: No     Comment: sober since 2/9/11    Drug use: No          Health Maintenance Due   Topic Date Due    ADVANCE CARE PLANNING  Never done    COLORECTAL CANCER SCREENING  Never done    DTAP/TDAP/TD IMMUNIZATION (3 - Td or Tdap) 01/28/2020    HPV TEST  01/24/2023    PAP  01/24/2023    Pneumococcal Vaccine: 50+ Years (1 of 1 - PCV) Never done    ZOSTER IMMUNIZATION (1 of 2) Never done    LUNG CANCER SCREENING  Never done    MAMMO SCREENING  05/11/2024    INFLUENZA VACCINE (1) 09/01/2024    COVID-19 Vaccine (2 - 2024-25 season) 09/01/2024    PHQ-9  03/04/2025          Lab Results   Component Value Date    PAP NIL 01/24/2018 February 4, 2025 2:25 PM

## 2025-02-04 NOTE — LETTER
My Depression Action Plan  Name: Susannah Goddard   Date of Birth 1975  Date: 2/4/2025    My doctor: Madyson Nunez   My clinic: [address] [address]  [phone]            GREEN    ZONE   Good Control    What it looks like:   Things are going generally well. You have normal ups and downs. You may even feel depressed from time to time, but bad moods usually last less than a day.   What you need to do:  Continue to care for yourself (see self care plan)  Check your depression survival kit and update it as needed  Follow your physician s recommendations including any medication.  Do not stop taking medication unless you consult with your physician first.             YELLOW         ZONE Getting Worse    What it looks like:   Depression is starting to interfere with your life.   It may be hard to get out of bed; you may be starting to isolate yourself from others.  Symptoms of depression are starting to last most all day and this has happened for several days.   You may have suicidal thoughts but they are not constant.   What you need to do:     Call your care team. Your response to treatment will improve if you keep your care team informed of your progress. Yellow periods are signs an adjustment may need to be made.     Continue your self-care.  Just get dressed and ready for the day.  Don't give yourself time to talk yourself out of it.    Talk to someone in your support network.    Open up your Depression Self-Care Plan/Wellness Kit.             RED    ZONE Medical Alert - Get Help    What it looks like:   Depression is seriously interfering with your life.   You may experience these or other symptoms: You can t get out of bed most days, can t work or engage in other necessary activities, you have trouble taking care of basic hygiene, or basic responsibilities, thoughts of suicide or death that will not go  away, self-injurious behavior.     What you need to do:  Call your care team and request a same-day appointment. If they are not available (weekends or after hours) call your local crisis line, emergency room or 911.          Depression Self-Care Plan / Wellness Kit    Many people find that medication and therapy are helpful treatments for managing depression. In addition, making small changes to your everyday life can help to boost your mood and improve your wellbeing. Below are some tips for you to consider. Be sure to talk with your medical provider and/or behavioral health consultant if your symptoms are worsening or not improving.     Sleep   Sleep hygiene  means all of the habits that support good, restful sleep. It includes maintaining a consistent bedtime and wake time, using your bedroom only for sleeping or sex, and keeping the bedroom dark and free of distractions like a computer, smartphone, or television.     Develop a Healthy Routine  Maintain good hygiene. Get out of bed in the morning, make your bed, brush your teeth, take a shower, and get dressed. Don t spend too much time viewing media that makes you feel stressed. Find time to relax each day.    Exercise  Get some form of exercise every day. This will help reduce pain and release endorphins, the  feel good  chemicals in your brain. It can be as simple as just going for a walk or doing some gardening, anything that will get you moving.      Diet  Strive to eat healthy foods, including fruits and vegetables. Drink plenty of water. Avoid excessive sugar, caffeine, alcohol, and other mood-altering substances.     Stay Connected with Others  Stay in touch with friends and family members.    Manage Your Mood  Try deep breathing, massage therapy, biofeedback, or meditation. Take part in fun activities when you can. Try to find something to smile about each day.     Psychotherapy  Be open to working with a therapist if your provider recommends it.      Medication  Be sure to take your medication as prescribed. Most anti-depressants need to be taken every day. It usually takes several weeks for medications to work. Not all medicines work for all people. It is important to follow-up with your provider to make sure you have a treatment plan that is working for you. Do not stop your medication abruptly without first discussing it with your provider.    Crisis Resources   These hotlines are for both adults and children. They and are open 24 hours a day, 7 days a week unless noted otherwise.    National Suicide Prevention Lifeline   988 or 8-113-106-IBJZ (1827)    Crisis Text Line    www.crisistextline.org  Text HOME to 235234 from anywhere in the [place], anytime, about any type of crisis. A live, trained crisis counselor will receive the text and respond quickly.    [facility] Lifeline for LGBTQ Youth  A national crisis intervention and suicide lifeline for LGBTQ youth under 25. Provides a safe place to talk without judgement. Call 1-114.243.3116; text START to 345217 or visit www.thetrevorproject.org to talk to a trained counselor.    For [facility] crisis numbers, visit the [facility] website at:  https://[place].gov/dhs/people-we-serve/adults/health-care/mental-health/resources/crisis-contacts.jsp

## 2025-06-08 LAB — NONINV COLON CA DNA+OCC BLD SCRN STL QL: NORMAL

## 2025-08-02 ENCOUNTER — HEALTH MAINTENANCE LETTER (OUTPATIENT)
Age: 50
End: 2025-08-02

## 2025-08-12 ENCOUNTER — VIRTUAL VISIT (OUTPATIENT)
Dept: FAMILY MEDICINE | Facility: CLINIC | Age: 50
End: 2025-08-12
Payer: COMMERCIAL

## 2025-08-12 DIAGNOSIS — R10.9 RIGHT FLANK PAIN: ICD-10-CM

## 2025-08-12 DIAGNOSIS — N95.1 MENOPAUSAL SYNDROME (HOT FLASHES): Primary | ICD-10-CM

## 2025-08-12 PROBLEM — N62 HYPERTROPHY OF BREAST: Status: RESOLVED | Noted: 2023-06-13 | Resolved: 2025-08-12
